# Patient Record
Sex: MALE | Race: WHITE | NOT HISPANIC OR LATINO | Employment: UNEMPLOYED | URBAN - METROPOLITAN AREA
[De-identification: names, ages, dates, MRNs, and addresses within clinical notes are randomized per-mention and may not be internally consistent; named-entity substitution may affect disease eponyms.]

---

## 2018-01-01 ENCOUNTER — HOSPITAL ENCOUNTER (INPATIENT)
Facility: HOSPITAL | Age: 83
LOS: 3 days | DRG: 308 | End: 2018-06-13
Attending: EMERGENCY MEDICINE | Admitting: INTERNAL MEDICINE
Payer: MEDICARE

## 2018-01-01 VITALS
HEART RATE: 59 BPM | OXYGEN SATURATION: 71 % | BODY MASS INDEX: 26.78 KG/M2 | SYSTOLIC BLOOD PRESSURE: 97 MMHG | WEIGHT: 197.75 LBS | HEIGHT: 72 IN | TEMPERATURE: 97 F | DIASTOLIC BLOOD PRESSURE: 55 MMHG

## 2018-01-01 DIAGNOSIS — Z46.59 ENCOUNTER FOR NASOGASTRIC (NG) TUBE PLACEMENT: ICD-10-CM

## 2018-01-01 DIAGNOSIS — J96.01 ACUTE HYPOXEMIC RESPIRATORY FAILURE: ICD-10-CM

## 2018-01-01 DIAGNOSIS — I49.01 CARDIAC ARREST WITH VENTRICULAR FIBRILLATION: ICD-10-CM

## 2018-01-01 DIAGNOSIS — Z01.818 ENCOUNTER FOR INTUBATION: ICD-10-CM

## 2018-01-01 DIAGNOSIS — I46.9 CARDIAC ARREST WITH VENTRICULAR FIBRILLATION: ICD-10-CM

## 2018-01-01 DIAGNOSIS — E87.5 HYPERKALEMIA: ICD-10-CM

## 2018-01-01 DIAGNOSIS — Z99.11 ON MECHANICALLY ASSISTED VENTILATION: ICD-10-CM

## 2018-01-01 DIAGNOSIS — I46.9 CARDIAC ARREST: ICD-10-CM

## 2018-01-01 LAB
ALBUMIN SERPL BCP-MCNC: 2.6 G/DL
ALBUMIN SERPL BCP-MCNC: 2.8 G/DL
ALBUMIN SERPL BCP-MCNC: 2.8 G/DL
ALBUMIN SERPL BCP-MCNC: 2.9 G/DL
ALBUMIN SERPL BCP-MCNC: 2.9 G/DL
ALBUMIN SERPL BCP-MCNC: 3.1 G/DL
ALLENS TEST: ABNORMAL
ALLENS TEST: NORMAL
ALP SERPL-CCNC: 104 U/L
ALP SERPL-CCNC: 134 U/L
ALP SERPL-CCNC: 157 U/L
ALP SERPL-CCNC: 158 U/L
ALP SERPL-CCNC: 91 U/L
ALP SERPL-CCNC: 96 U/L
ALT SERPL W/O P-5'-P-CCNC: 115 U/L
ALT SERPL W/O P-5'-P-CCNC: 172 U/L
ALT SERPL W/O P-5'-P-CCNC: 259 U/L
ALT SERPL W/O P-5'-P-CCNC: 390 U/L
ALT SERPL W/O P-5'-P-CCNC: 391 U/L
ALT SERPL W/O P-5'-P-CCNC: 92 U/L
AMORPH CRY UR QL COMP ASSIST: ABNORMAL
ANION GAP SERPL CALC-SCNC: 12 MMOL/L
ANION GAP SERPL CALC-SCNC: 13 MMOL/L
ANION GAP SERPL CALC-SCNC: 13 MMOL/L
ANION GAP SERPL CALC-SCNC: 14 MMOL/L
ANION GAP SERPL CALC-SCNC: 15 MMOL/L
ANION GAP SERPL CALC-SCNC: 16 MMOL/L
ANION GAP SERPL CALC-SCNC: 17 MMOL/L
ANION GAP SERPL CALC-SCNC: 17 MMOL/L
ANION GAP SERPL CALC-SCNC: 19 MMOL/L
ANION GAP SERPL CALC-SCNC: 19 MMOL/L
ANION GAP SERPL CALC-SCNC: 20 MMOL/L
ANION GAP SERPL CALC-SCNC: 20 MMOL/L
ANION GAP SERPL CALC-SCNC: 22 MMOL/L
ANION GAP SERPL CALC-SCNC: 22 MMOL/L
ANION GAP SERPL CALC-SCNC: 28 MMOL/L
ANISOCYTOSIS BLD QL SMEAR: SLIGHT
APTT BLDCRRT: 24.2 SEC
APTT BLDCRRT: 35.1 SEC
APTT BLDCRRT: 54.4 SEC
APTT BLDCRRT: <21 SEC
AST SERPL-CCNC: 130 U/L
AST SERPL-CCNC: 250 U/L
AST SERPL-CCNC: 358 U/L
AST SERPL-CCNC: 369 U/L
AST SERPL-CCNC: 73 U/L
AST SERPL-CCNC: 76 U/L
BACTERIA #/AREA URNS AUTO: ABNORMAL /HPF
BASOPHILS # BLD AUTO: 0.03 K/UL
BASOPHILS # BLD AUTO: 0.03 K/UL
BASOPHILS # BLD AUTO: 0.05 K/UL
BASOPHILS # BLD AUTO: 0.07 K/UL
BASOPHILS # BLD AUTO: ABNORMAL K/UL
BASOPHILS NFR BLD: 0 %
BASOPHILS NFR BLD: 0.1 %
BASOPHILS NFR BLD: 0.1 %
BASOPHILS NFR BLD: 0.2 %
BASOPHILS NFR BLD: 0.2 %
BILIRUB SERPL-MCNC: 0.3 MG/DL
BILIRUB SERPL-MCNC: 0.4 MG/DL
BILIRUB SERPL-MCNC: 0.5 MG/DL
BILIRUB UR QL STRIP: NEGATIVE
BUN SERPL-MCNC: 39 MG/DL
BUN SERPL-MCNC: 41 MG/DL
BUN SERPL-MCNC: 42 MG/DL
BUN SERPL-MCNC: 44 MG/DL (ref 6–30)
BUN SERPL-MCNC: 50 MG/DL
BUN SERPL-MCNC: 55 MG/DL
BUN SERPL-MCNC: 58 MG/DL
BUN SERPL-MCNC: 61 MG/DL
BUN SERPL-MCNC: 61 MG/DL
BUN SERPL-MCNC: 65 MG/DL
BUN SERPL-MCNC: 65 MG/DL
BUN SERPL-MCNC: 69 MG/DL
BUN SERPL-MCNC: 70 MG/DL
BUN SERPL-MCNC: 72 MG/DL
BUN SERPL-MCNC: 74 MG/DL
BUN SERPL-MCNC: 74 MG/DL
BUN SERPL-MCNC: 77 MG/DL
BUN SERPL-MCNC: 77 MG/DL
BUN SERPL-MCNC: 82 MG/DL
BUN SERPL-MCNC: 84 MG/DL
BUN SERPL-MCNC: 86 MG/DL
BUN SERPL-MCNC: 97 MG/DL
BUN SERPL-MCNC: 97 MG/DL
BURR CELLS BLD QL SMEAR: ABNORMAL
CA-I BLDV-SCNC: 1.2 MMOL/L
CALCIUM SERPL-MCNC: 10.1 MG/DL
CALCIUM SERPL-MCNC: 8.3 MG/DL
CALCIUM SERPL-MCNC: 8.5 MG/DL
CALCIUM SERPL-MCNC: 8.7 MG/DL
CALCIUM SERPL-MCNC: 8.7 MG/DL
CALCIUM SERPL-MCNC: 8.8 MG/DL
CALCIUM SERPL-MCNC: 8.8 MG/DL
CALCIUM SERPL-MCNC: 9 MG/DL
CALCIUM SERPL-MCNC: 9.1 MG/DL
CALCIUM SERPL-MCNC: 9.1 MG/DL
CALCIUM SERPL-MCNC: 9.2 MG/DL
CALCIUM SERPL-MCNC: 9.3 MG/DL
CALCIUM SERPL-MCNC: 9.3 MG/DL
CALCIUM SERPL-MCNC: 9.4 MG/DL
CHLORIDE SERPL-SCNC: 100 MMOL/L
CHLORIDE SERPL-SCNC: 101 MMOL/L
CHLORIDE SERPL-SCNC: 101 MMOL/L
CHLORIDE SERPL-SCNC: 103 MMOL/L
CHLORIDE SERPL-SCNC: 104 MMOL/L
CHLORIDE SERPL-SCNC: 105 MMOL/L
CHLORIDE SERPL-SCNC: 105 MMOL/L
CHLORIDE SERPL-SCNC: 106 MMOL/L
CHLORIDE SERPL-SCNC: 107 MMOL/L (ref 95–110)
CHLORIDE SERPL-SCNC: 98 MMOL/L
CHLORIDE SERPL-SCNC: 98 MMOL/L
CHLORIDE SERPL-SCNC: 99 MMOL/L
CHLORIDE SERPL-SCNC: 99 MMOL/L
CK SERPL-CCNC: 1134 U/L
CK SERPL-CCNC: 1625 U/L
CLARITY UR REFRACT.AUTO: ABNORMAL
CO2 SERPL-SCNC: 14 MMOL/L
CO2 SERPL-SCNC: 18 MMOL/L
CO2 SERPL-SCNC: 18 MMOL/L
CO2 SERPL-SCNC: 21 MMOL/L
CO2 SERPL-SCNC: 23 MMOL/L
CO2 SERPL-SCNC: 24 MMOL/L
CO2 SERPL-SCNC: 25 MMOL/L
CO2 SERPL-SCNC: 25 MMOL/L
CO2 SERPL-SCNC: 26 MMOL/L
CO2 SERPL-SCNC: 27 MMOL/L
COLOR UR AUTO: YELLOW
CREAT SERPL-MCNC: 2.9 MG/DL (ref 0.5–1.4)
CREAT SERPL-MCNC: 3.2 MG/DL
CREAT SERPL-MCNC: 3.4 MG/DL
CREAT SERPL-MCNC: 3.4 MG/DL
CREAT SERPL-MCNC: 3.8 MG/DL
CREAT SERPL-MCNC: 4.1 MG/DL
CREAT SERPL-MCNC: 4.4 MG/DL
CREAT SERPL-MCNC: 4.5 MG/DL
CREAT SERPL-MCNC: 4.5 MG/DL
CREAT SERPL-MCNC: 4.9 MG/DL
CREAT SERPL-MCNC: 5 MG/DL
CREAT SERPL-MCNC: 5.3 MG/DL
CREAT SERPL-MCNC: 5.6 MG/DL
CREAT SERPL-MCNC: 5.8 MG/DL
CREAT SERPL-MCNC: 6.3 MG/DL
CREAT SERPL-MCNC: 6.3 MG/DL
CREAT SERPL-MCNC: 6.5 MG/DL
CREAT SERPL-MCNC: 6.7 MG/DL
CREAT SERPL-MCNC: 7.1 MG/DL
CREAT SERPL-MCNC: 7.8 MG/DL
CREAT SERPL-MCNC: 7.8 MG/DL
DELSYS: ABNORMAL
DELSYS: NORMAL
DIFFERENTIAL METHOD: ABNORMAL
EOSINOPHIL # BLD AUTO: 0 K/UL
EOSINOPHIL # BLD AUTO: ABNORMAL K/UL
EOSINOPHIL NFR BLD: 0 %
EOSINOPHIL NFR BLD: 0.1 %
ERYTHROCYTE [DISTWIDTH] IN BLOOD BY AUTOMATED COUNT: 13.4 %
ERYTHROCYTE [DISTWIDTH] IN BLOOD BY AUTOMATED COUNT: 13.6 %
ERYTHROCYTE [DISTWIDTH] IN BLOOD BY AUTOMATED COUNT: 13.6 %
ERYTHROCYTE [DISTWIDTH] IN BLOOD BY AUTOMATED COUNT: 14 %
ERYTHROCYTE [DISTWIDTH] IN BLOOD BY AUTOMATED COUNT: 14.3 %
ERYTHROCYTE [SEDIMENTATION RATE] IN BLOOD BY WESTERGREN METHOD: 16 MM/H
ERYTHROCYTE [SEDIMENTATION RATE] IN BLOOD BY WESTERGREN METHOD: 16 MM/H
ERYTHROCYTE [SEDIMENTATION RATE] IN BLOOD BY WESTERGREN METHOD: 20 MM/H
ERYTHROCYTE [SEDIMENTATION RATE] IN BLOOD BY WESTERGREN METHOD: 20 MM/H
EST. GFR  (AFRICAN AMERICAN): 10 ML/MIN/1.73 M^2
EST. GFR  (AFRICAN AMERICAN): 10.7 ML/MIN/1.73 M^2
EST. GFR  (AFRICAN AMERICAN): 11.4 ML/MIN/1.73 M^2
EST. GFR  (AFRICAN AMERICAN): 11.7 ML/MIN/1.73 M^2
EST. GFR  (AFRICAN AMERICAN): 13 ML/MIN/1.73 M^2
EST. GFR  (AFRICAN AMERICAN): 13 ML/MIN/1.73 M^2
EST. GFR  (AFRICAN AMERICAN): 13.4 ML/MIN/1.73 M^2
EST. GFR  (AFRICAN AMERICAN): 14.5 ML/MIN/1.73 M^2
EST. GFR  (AFRICAN AMERICAN): 15.9 ML/MIN/1.73 M^2
EST. GFR  (AFRICAN AMERICAN): 18.2 ML/MIN/1.73 M^2
EST. GFR  (AFRICAN AMERICAN): 18.2 ML/MIN/1.73 M^2
EST. GFR  (AFRICAN AMERICAN): 19.6 ML/MIN/1.73 M^2
EST. GFR  (AFRICAN AMERICAN): 6.7 ML/MIN/1.73 M^2
EST. GFR  (AFRICAN AMERICAN): 6.7 ML/MIN/1.73 M^2
EST. GFR  (AFRICAN AMERICAN): 7.5 ML/MIN/1.73 M^2
EST. GFR  (AFRICAN AMERICAN): 8 ML/MIN/1.73 M^2
EST. GFR  (AFRICAN AMERICAN): 8.3 ML/MIN/1.73 M^2
EST. GFR  (AFRICAN AMERICAN): 8.7 ML/MIN/1.73 M^2
EST. GFR  (AFRICAN AMERICAN): 8.7 ML/MIN/1.73 M^2
EST. GFR  (AFRICAN AMERICAN): 9.6 ML/MIN/1.73 M^2
EST. GFR  (NON AFRICAN AMERICAN): 10.1 ML/MIN/1.73 M^2
EST. GFR  (NON AFRICAN AMERICAN): 11.2 ML/MIN/1.73 M^2
EST. GFR  (NON AFRICAN AMERICAN): 11.2 ML/MIN/1.73 M^2
EST. GFR  (NON AFRICAN AMERICAN): 11.6 ML/MIN/1.73 M^2
EST. GFR  (NON AFRICAN AMERICAN): 12.6 ML/MIN/1.73 M^2
EST. GFR  (NON AFRICAN AMERICAN): 13.8 ML/MIN/1.73 M^2
EST. GFR  (NON AFRICAN AMERICAN): 15.8 ML/MIN/1.73 M^2
EST. GFR  (NON AFRICAN AMERICAN): 15.8 ML/MIN/1.73 M^2
EST. GFR  (NON AFRICAN AMERICAN): 17 ML/MIN/1.73 M^2
EST. GFR  (NON AFRICAN AMERICAN): 5.8 ML/MIN/1.73 M^2
EST. GFR  (NON AFRICAN AMERICAN): 5.8 ML/MIN/1.73 M^2
EST. GFR  (NON AFRICAN AMERICAN): 6.5 ML/MIN/1.73 M^2
EST. GFR  (NON AFRICAN AMERICAN): 6.9 ML/MIN/1.73 M^2
EST. GFR  (NON AFRICAN AMERICAN): 7.2 ML/MIN/1.73 M^2
EST. GFR  (NON AFRICAN AMERICAN): 7.5 ML/MIN/1.73 M^2
EST. GFR  (NON AFRICAN AMERICAN): 7.5 ML/MIN/1.73 M^2
EST. GFR  (NON AFRICAN AMERICAN): 8.3 ML/MIN/1.73 M^2
EST. GFR  (NON AFRICAN AMERICAN): 8.6 ML/MIN/1.73 M^2
EST. GFR  (NON AFRICAN AMERICAN): 9.2 ML/MIN/1.73 M^2
EST. GFR  (NON AFRICAN AMERICAN): 9.9 ML/MIN/1.73 M^2
ESTIMATED AVG GLUCOSE: 146 MG/DL
FACT X PPP CHRO-ACNC: 0.28 IU/ML
FACT X PPP CHRO-ACNC: 0.44 IU/ML
FACT X PPP CHRO-ACNC: 0.5 IU/ML
FACT X PPP CHRO-ACNC: 0.6 IU/ML
FACT X PPP CHRO-ACNC: 0.84 IU/ML
FACT X PPP CHRO-ACNC: 0.84 IU/ML
FIO2: 100
FIO2: 60
GLUCOSE SERPL-MCNC: 105 MG/DL
GLUCOSE SERPL-MCNC: 105 MG/DL
GLUCOSE SERPL-MCNC: 107 MG/DL
GLUCOSE SERPL-MCNC: 107 MG/DL
GLUCOSE SERPL-MCNC: 111 MG/DL
GLUCOSE SERPL-MCNC: 111 MG/DL
GLUCOSE SERPL-MCNC: 116 MG/DL
GLUCOSE SERPL-MCNC: 125 MG/DL
GLUCOSE SERPL-MCNC: 125 MG/DL
GLUCOSE SERPL-MCNC: 142 MG/DL
GLUCOSE SERPL-MCNC: 142 MG/DL
GLUCOSE SERPL-MCNC: 163 MG/DL
GLUCOSE SERPL-MCNC: 209 MG/DL
GLUCOSE SERPL-MCNC: 209 MG/DL
GLUCOSE SERPL-MCNC: 235 MG/DL (ref 70–110)
GLUCOSE SERPL-MCNC: 244 MG/DL
GLUCOSE SERPL-MCNC: 299 MG/DL
GLUCOSE SERPL-MCNC: 299 MG/DL
GLUCOSE SERPL-MCNC: 305 MG/DL
GLUCOSE SERPL-MCNC: 315 MG/DL
GLUCOSE SERPL-MCNC: 353 MG/DL
GLUCOSE SERPL-MCNC: 353 MG/DL
GLUCOSE SERPL-MCNC: 375 MG/DL
GLUCOSE SERPL-MCNC: 375 MG/DL
GLUCOSE SERPL-MCNC: 86 MG/DL
GLUCOSE SERPL-MCNC: 88 MG/DL
GLUCOSE SERPL-MCNC: 88 MG/DL
GLUCOSE SERPL-MCNC: 89 MG/DL
GLUCOSE SERPL-MCNC: 89 MG/DL
GLUCOSE SERPL-MCNC: 93 MG/DL
GLUCOSE SERPL-MCNC: 93 MG/DL
GLUCOSE SERPL-MCNC: 95 MG/DL
GLUCOSE SERPL-MCNC: 95 MG/DL
GLUCOSE UR QL STRIP: ABNORMAL
HBA1C MFR BLD HPLC: 6.7 %
HCO3 UR-SCNC: 21.3 MMOL/L (ref 24–28)
HCO3 UR-SCNC: 23.9 MMOL/L (ref 24–28)
HCO3 UR-SCNC: 26.6 MMOL/L (ref 24–28)
HCO3 UR-SCNC: 28.4 MMOL/L (ref 24–28)
HCT VFR BLD AUTO: 27.1 %
HCT VFR BLD AUTO: 27.3 %
HCT VFR BLD AUTO: 27.4 %
HCT VFR BLD AUTO: 32.6 %
HCT VFR BLD AUTO: 38.2 %
HCT VFR BLD CALC: 33 %PCV (ref 36–54)
HGB BLD-MCNC: 10.4 G/DL
HGB BLD-MCNC: 11.2 G/DL
HGB BLD-MCNC: 8.6 G/DL
HGB BLD-MCNC: 8.7 G/DL
HGB BLD-MCNC: 8.8 G/DL
HGB UR QL STRIP: ABNORMAL
HYALINE CASTS UR QL AUTO: 0 /LPF
HYPOCHROMIA BLD QL SMEAR: ABNORMAL
IMM GRANULOCYTES # BLD AUTO: 0.14 K/UL
IMM GRANULOCYTES # BLD AUTO: 0.26 K/UL
IMM GRANULOCYTES # BLD AUTO: 0.36 K/UL
IMM GRANULOCYTES # BLD AUTO: 0.5 K/UL
IMM GRANULOCYTES # BLD AUTO: ABNORMAL K/UL
IMM GRANULOCYTES NFR BLD AUTO: 0.6 %
IMM GRANULOCYTES NFR BLD AUTO: 1 %
IMM GRANULOCYTES NFR BLD AUTO: 1.6 %
IMM GRANULOCYTES NFR BLD AUTO: 1.7 %
IMM GRANULOCYTES NFR BLD AUTO: ABNORMAL %
INR PPP: 1
INR PPP: 1
INR PPP: 1.1
IP: 25
IT: 0.9
KETONES UR QL STRIP: NEGATIVE
LACTATE SERPL-SCNC: 0.7 MMOL/L
LACTATE SERPL-SCNC: 0.7 MMOL/L
LACTATE SERPL-SCNC: 0.8 MMOL/L
LACTATE SERPL-SCNC: 0.8 MMOL/L
LACTATE SERPL-SCNC: 0.9 MMOL/L
LACTATE SERPL-SCNC: 0.9 MMOL/L
LACTATE SERPL-SCNC: 1.2 MMOL/L
LACTATE SERPL-SCNC: 1.3 MMOL/L
LACTATE SERPL-SCNC: 1.3 MMOL/L
LACTATE SERPL-SCNC: 1.4 MMOL/L
LACTATE SERPL-SCNC: 1.9 MMOL/L
LACTATE SERPL-SCNC: 2.1 MMOL/L
LACTATE SERPL-SCNC: 2.3 MMOL/L
LACTATE SERPL-SCNC: 2.5 MMOL/L
LACTATE SERPL-SCNC: 2.6 MMOL/L
LACTATE SERPL-SCNC: >12 MMOL/L
LDH SERPL L TO P-CCNC: 10.93 MMOL/L (ref 0.36–1.25)
LEUKOCYTE ESTERASE UR QL STRIP: NEGATIVE
LYMPHOCYTES # BLD AUTO: 1 K/UL
LYMPHOCYTES # BLD AUTO: 1.1 K/UL
LYMPHOCYTES # BLD AUTO: 1.4 K/UL
LYMPHOCYTES # BLD AUTO: 2 K/UL
LYMPHOCYTES # BLD AUTO: ABNORMAL K/UL
LYMPHOCYTES NFR BLD: 3.2 %
LYMPHOCYTES NFR BLD: 5.1 %
LYMPHOCYTES NFR BLD: 5.2 %
LYMPHOCYTES NFR BLD: 54 %
LYMPHOCYTES NFR BLD: 9.1 %
MAGNESIUM SERPL-MCNC: 1.7 MG/DL
MAGNESIUM SERPL-MCNC: 2 MG/DL
MAGNESIUM SERPL-MCNC: 2.1 MG/DL
MAGNESIUM SERPL-MCNC: 2.1 MG/DL
MAGNESIUM SERPL-MCNC: 2.2 MG/DL
MAGNESIUM SERPL-MCNC: 2.2 MG/DL
MAGNESIUM SERPL-MCNC: 2.3 MG/DL
MAP: 16
MCH RBC QN AUTO: 30.4 PG
MCH RBC QN AUTO: 30.8 PG
MCH RBC QN AUTO: 31.2 PG
MCH RBC QN AUTO: 31.3 PG
MCH RBC QN AUTO: 31.3 PG
MCHC RBC AUTO-ENTMCNC: 29.3 G/DL
MCHC RBC AUTO-ENTMCNC: 31.7 G/DL
MCHC RBC AUTO-ENTMCNC: 31.8 G/DL
MCHC RBC AUTO-ENTMCNC: 31.9 G/DL
MCHC RBC AUTO-ENTMCNC: 32.2 G/DL
MCV RBC AUTO: 105 FL
MCV RBC AUTO: 95 FL
MCV RBC AUTO: 98 FL
MCV RBC AUTO: 99 FL
MCV RBC AUTO: 99 FL
METAMYELOCYTES NFR BLD MANUAL: 1 %
MICROSCOPIC COMMENT: ABNORMAL
MITRAL VALVE MOBILITY: NORMAL
MITRAL VALVE REGURGITATION: ABNORMAL
MODE: ABNORMAL
MODE: NORMAL
MONOCYTES # BLD AUTO: 1.3 K/UL
MONOCYTES # BLD AUTO: 1.4 K/UL
MONOCYTES # BLD AUTO: 2.1 K/UL
MONOCYTES # BLD AUTO: 2.5 K/UL
MONOCYTES # BLD AUTO: ABNORMAL K/UL
MONOCYTES NFR BLD: 4.4 %
MONOCYTES NFR BLD: 5 %
MONOCYTES NFR BLD: 6.1 %
MONOCYTES NFR BLD: 9.2 %
MONOCYTES NFR BLD: 9.5 %
NEUTROPHILS # BLD AUTO: 17.4 K/UL
NEUTROPHILS # BLD AUTO: 19.4 K/UL
NEUTROPHILS # BLD AUTO: 22.9 K/UL
NEUTROPHILS # BLD AUTO: 26.8 K/UL
NEUTROPHILS NFR BLD: 39 %
NEUTROPHILS NFR BLD: 80.7 %
NEUTROPHILS NFR BLD: 84.4 %
NEUTROPHILS NFR BLD: 87.1 %
NEUTROPHILS NFR BLD: 90.4 %
NEUTS BAND NFR BLD MANUAL: 1 %
NITRITE UR QL STRIP: NEGATIVE
NRBC BLD-RTO: 0 /100 WBC
NSE SERPL-MCNC: 29 NG/ML
NSE SERPL-MCNC: NORMAL UG/L
PCO2 BLDA: 30.7 MMHG (ref 35–45)
PCO2 BLDA: 47.3 MMHG (ref 35–45)
PCO2 BLDA: 54.5 MMHG (ref 35–45)
PCO2 BLDA: 81.1 MMHG (ref 35–45)
PEEP: 5
PEEP: 8
PH SMN: 7.03 [PH] (ref 7.35–7.45)
PH SMN: 7.25 [PH] (ref 7.35–7.45)
PH SMN: 7.39 [PH] (ref 7.35–7.45)
PH SMN: 7.55 [PH] (ref 7.35–7.45)
PH UR STRIP: 6 [PH] (ref 5–8)
PHOSPHATE SERPL-MCNC: 2.8 MG/DL
PHOSPHATE SERPL-MCNC: 3.2 MG/DL
PHOSPHATE SERPL-MCNC: 3.7 MG/DL
PHOSPHATE SERPL-MCNC: 4.2 MG/DL
PHOSPHATE SERPL-MCNC: 4.4 MG/DL
PHOSPHATE SERPL-MCNC: 4.5 MG/DL
PHOSPHATE SERPL-MCNC: 5 MG/DL
PHOSPHATE SERPL-MCNC: 5.1 MG/DL
PHOSPHATE SERPL-MCNC: 5.5 MG/DL
PHOSPHATE SERPL-MCNC: 5.6 MG/DL
PHOSPHATE SERPL-MCNC: 6 MG/DL
PHOSPHATE SERPL-MCNC: 6.2 MG/DL
PHOSPHATE SERPL-MCNC: 6.5 MG/DL
PHOSPHATE SERPL-MCNC: 6.7 MG/DL
PHOSPHATE SERPL-MCNC: 6.9 MG/DL
PHOSPHATE SERPL-MCNC: 7.7 MG/DL
PHOSPHATE SERPL-MCNC: 8.2 MG/DL
PHOSPHATE SERPL-MCNC: 8.9 MG/DL
PIP: 33
PLATELET # BLD AUTO: 162 K/UL
PLATELET # BLD AUTO: 165 K/UL
PLATELET # BLD AUTO: 168 K/UL
PLATELET # BLD AUTO: 178 K/UL
PLATELET # BLD AUTO: 207 K/UL
PLATELET BLD QL SMEAR: ABNORMAL
PMV BLD AUTO: 10 FL
PMV BLD AUTO: 10.3 FL
PMV BLD AUTO: 10.7 FL
PMV BLD AUTO: 11.2 FL
PMV BLD AUTO: 11.4 FL
PO2 BLDA: 150 MMHG (ref 80–100)
PO2 BLDA: 327 MMHG (ref 80–100)
PO2 BLDA: 39 MMHG (ref 40–60)
PO2 BLDA: 76 MMHG (ref 80–100)
POC BE: -10 MMOL/L
POC BE: -3 MMOL/L
POC BE: 3 MMOL/L
POC BE: 4 MMOL/L
POC IONIZED CALCIUM: 1.21 MMOL/L (ref 1.06–1.42)
POC SATURATED O2: 100 % (ref 95–100)
POC SATURATED O2: 100 % (ref 95–100)
POC SATURATED O2: 48 % (ref 95–100)
POC SATURATED O2: 95 % (ref 95–100)
POC TCO2 (MEASURED): 21 MMOL/L (ref 23–29)
POC TCO2: 24 MMOL/L (ref 24–29)
POC TCO2: 25 MMOL/L (ref 23–27)
POC TCO2: 28 MMOL/L (ref 23–27)
POC TCO2: 30 MMOL/L (ref 23–27)
POCT GLUCOSE: 119 MG/DL (ref 70–110)
POCT GLUCOSE: 143 MG/DL (ref 70–110)
POCT GLUCOSE: 143 MG/DL (ref 70–110)
POCT GLUCOSE: 165 MG/DL (ref 70–110)
POCT GLUCOSE: 270 MG/DL (ref 70–110)
POCT GLUCOSE: 335 MG/DL (ref 70–110)
POCT GLUCOSE: 420 MG/DL (ref 70–110)
POCT GLUCOSE: 75 MG/DL (ref 70–110)
POCT GLUCOSE: 78 MG/DL (ref 70–110)
POCT GLUCOSE: 91 MG/DL (ref 70–110)
POCT GLUCOSE: 94 MG/DL (ref 70–110)
POIKILOCYTOSIS BLD QL SMEAR: SLIGHT
POLYCHROMASIA BLD QL SMEAR: ABNORMAL
POTASSIUM BLD-SCNC: 3.9 MMOL/L (ref 3.5–5.1)
POTASSIUM BLD-SCNC: 4.5 MMOL/L (ref 3.5–5.1)
POTASSIUM SERPL-SCNC: 4 MMOL/L
POTASSIUM SERPL-SCNC: 4.1 MMOL/L
POTASSIUM SERPL-SCNC: 4.2 MMOL/L
POTASSIUM SERPL-SCNC: 4.4 MMOL/L
POTASSIUM SERPL-SCNC: 4.5 MMOL/L
POTASSIUM SERPL-SCNC: 4.5 MMOL/L
POTASSIUM SERPL-SCNC: 4.6 MMOL/L
POTASSIUM SERPL-SCNC: 4.8 MMOL/L
POTASSIUM SERPL-SCNC: 5 MMOL/L
POTASSIUM SERPL-SCNC: 5.2 MMOL/L
POTASSIUM SERPL-SCNC: 5.5 MMOL/L
POTASSIUM SERPL-SCNC: 5.8 MMOL/L
PROT SERPL-MCNC: 6.1 G/DL
PROT SERPL-MCNC: 6.4 G/DL
PROT SERPL-MCNC: 6.5 G/DL
PROT SERPL-MCNC: 7.1 G/DL
PROT UR QL STRIP: ABNORMAL
PROTHROMBIN TIME: 10.3 SEC
PROTHROMBIN TIME: 10.4 SEC
PROTHROMBIN TIME: 11 SEC
PROTHROMBIN TIME: 11.3 SEC
PROTHROMBIN TIME: 11.4 SEC
RBC # BLD AUTO: 2.75 M/UL
RBC # BLD AUTO: 2.78 M/UL
RBC # BLD AUTO: 2.89 M/UL
RBC # BLD AUTO: 3.33 M/UL
RBC # BLD AUTO: 3.64 M/UL
RBC #/AREA URNS AUTO: 7 /HPF (ref 0–4)
RETIRED EF AND QEF - SEE NOTES: 35 (ref 55–65)
SAMPLE: ABNORMAL
SAMPLE: NORMAL
SITE: ABNORMAL
SITE: NORMAL
SODIUM BLD-SCNC: 146 MMOL/L (ref 136–145)
SODIUM SERPL-SCNC: 138 MMOL/L
SODIUM SERPL-SCNC: 138 MMOL/L
SODIUM SERPL-SCNC: 140 MMOL/L
SODIUM SERPL-SCNC: 141 MMOL/L
SODIUM SERPL-SCNC: 142 MMOL/L
SODIUM SERPL-SCNC: 142 MMOL/L
SODIUM SERPL-SCNC: 143 MMOL/L
SODIUM SERPL-SCNC: 144 MMOL/L
SODIUM SERPL-SCNC: 148 MMOL/L
SP GR UR STRIP: 1.01 (ref 1–1.03)
SP02: 100
SQUAMOUS #/AREA URNS AUTO: 2 /HPF
TROPONIN I SERPL DL<=0.01 NG/ML-MCNC: 0.07 NG/ML
TROPONIN I SERPL DL<=0.01 NG/ML-MCNC: 33.3 NG/ML
TROPONIN I SERPL DL<=0.01 NG/ML-MCNC: >50 NG/ML
TROPONIN I SERPL DL<=0.01 NG/ML-MCNC: >50 NG/ML
URN SPEC COLLECT METH UR: ABNORMAL
UROBILINOGEN UR STRIP-ACNC: NEGATIVE EU/DL
VT: 450
VT: 500
VT: 500
VT: 635
WBC # BLD AUTO: 20.86 K/UL
WBC # BLD AUTO: 21.61 K/UL
WBC # BLD AUTO: 22.29 K/UL
WBC # BLD AUTO: 27.09 K/UL
WBC # BLD AUTO: 29.64 K/UL
WBC #/AREA URNS AUTO: 6 /HPF (ref 0–5)

## 2018-01-01 PROCEDURE — 99291 CRITICAL CARE FIRST HOUR: CPT | Mod: GC,,, | Performed by: INTERNAL MEDICINE

## 2018-01-01 PROCEDURE — 99900026 HC AIRWAY MAINTENANCE (STAT)

## 2018-01-01 PROCEDURE — 83520 IMMUNOASSAY QUANT NOS NONAB: CPT

## 2018-01-01 PROCEDURE — 94761 N-INVAS EAR/PLS OXIMETRY MLT: CPT

## 2018-01-01 PROCEDURE — 83735 ASSAY OF MAGNESIUM: CPT

## 2018-01-01 PROCEDURE — 83036 HEMOGLOBIN GLYCOSYLATED A1C: CPT

## 2018-01-01 PROCEDURE — 85730 THROMBOPLASTIN TIME PARTIAL: CPT

## 2018-01-01 PROCEDURE — 25000003 PHARM REV CODE 250: Performed by: STUDENT IN AN ORGANIZED HEALTH CARE EDUCATION/TRAINING PROGRAM

## 2018-01-01 PROCEDURE — 96366 THER/PROPH/DIAG IV INF ADDON: CPT

## 2018-01-01 PROCEDURE — 63600175 PHARM REV CODE 636 W HCPCS: Performed by: INTERNAL MEDICINE

## 2018-01-01 PROCEDURE — 31500 INSERT EMERGENCY AIRWAY: CPT | Mod: ,,, | Performed by: EMERGENCY MEDICINE

## 2018-01-01 PROCEDURE — 80053 COMPREHEN METABOLIC PANEL: CPT

## 2018-01-01 PROCEDURE — 83735 ASSAY OF MAGNESIUM: CPT | Mod: 91

## 2018-01-01 PROCEDURE — 81001 URINALYSIS AUTO W/SCOPE: CPT

## 2018-01-01 PROCEDURE — 63600175 PHARM REV CODE 636 W HCPCS: Performed by: HOSPITALIST

## 2018-01-01 PROCEDURE — 63600175 PHARM REV CODE 636 W HCPCS

## 2018-01-01 PROCEDURE — 84484 ASSAY OF TROPONIN QUANT: CPT | Mod: 91

## 2018-01-01 PROCEDURE — 94003 VENT MGMT INPAT SUBQ DAY: CPT

## 2018-01-01 PROCEDURE — 27000221 HC OXYGEN, UP TO 24 HOURS

## 2018-01-01 PROCEDURE — 85025 COMPLETE CBC W/AUTO DIFF WBC: CPT

## 2018-01-01 PROCEDURE — 06HM33Z INSERTION OF INFUSION DEVICE INTO RIGHT FEMORAL VEIN, PERCUTANEOUS APPROACH: ICD-10-PCS | Performed by: INTERNAL MEDICINE

## 2018-01-01 PROCEDURE — 80048 BASIC METABOLIC PNL TOTAL CA: CPT

## 2018-01-01 PROCEDURE — 63600175 PHARM REV CODE 636 W HCPCS: Performed by: STUDENT IN AN ORGANIZED HEALTH CARE EDUCATION/TRAINING PROGRAM

## 2018-01-01 PROCEDURE — 85610 PROTHROMBIN TIME: CPT

## 2018-01-01 PROCEDURE — 63600175 PHARM REV CODE 636 W HCPCS: Performed by: EMERGENCY MEDICINE

## 2018-01-01 PROCEDURE — S0028 INJECTION, FAMOTIDINE, 20 MG: HCPCS | Performed by: STUDENT IN AN ORGANIZED HEALTH CARE EDUCATION/TRAINING PROGRAM

## 2018-01-01 PROCEDURE — 36556 INSERT NON-TUNNEL CV CATH: CPT

## 2018-01-01 PROCEDURE — 85520 HEPARIN ASSAY: CPT

## 2018-01-01 PROCEDURE — 31500 INSERT EMERGENCY AIRWAY: CPT

## 2018-01-01 PROCEDURE — 83605 ASSAY OF LACTIC ACID: CPT | Mod: 91

## 2018-01-01 PROCEDURE — 93306 TTE W/DOPPLER COMPLETE: CPT

## 2018-01-01 PROCEDURE — 82550 ASSAY OF CK (CPK): CPT | Mod: 91

## 2018-01-01 PROCEDURE — 25000003 PHARM REV CODE 250: Performed by: EMERGENCY MEDICINE

## 2018-01-01 PROCEDURE — 93010 ELECTROCARDIOGRAM REPORT: CPT | Mod: 76,,, | Performed by: INTERNAL MEDICINE

## 2018-01-01 PROCEDURE — 0BH17EZ INSERTION OF ENDOTRACHEAL AIRWAY INTO TRACHEA, VIA NATURAL OR ARTIFICIAL OPENING: ICD-10-PCS | Performed by: EMERGENCY MEDICINE

## 2018-01-01 PROCEDURE — 20000000 HC ICU ROOM

## 2018-01-01 PROCEDURE — 84132 ASSAY OF SERUM POTASSIUM: CPT

## 2018-01-01 PROCEDURE — 96368 THER/DIAG CONCURRENT INF: CPT

## 2018-01-01 PROCEDURE — 27100171 HC OXYGEN HIGH FLOW UP TO 24 HOURS

## 2018-01-01 PROCEDURE — 84100 ASSAY OF PHOSPHORUS: CPT

## 2018-01-01 PROCEDURE — 80048 BASIC METABOLIC PNL TOTAL CA: CPT | Mod: 91

## 2018-01-01 PROCEDURE — 99291 CRITICAL CARE FIRST HOUR: CPT | Mod: 25

## 2018-01-01 PROCEDURE — 82803 BLOOD GASES ANY COMBINATION: CPT

## 2018-01-01 PROCEDURE — 43752 NASAL/OROGASTRIC W/TUBE PLMT: CPT

## 2018-01-01 PROCEDURE — 83605 ASSAY OF LACTIC ACID: CPT

## 2018-01-01 PROCEDURE — 99900035 HC TECH TIME PER 15 MIN (STAT)

## 2018-01-01 PROCEDURE — 99285 EMERGENCY DEPT VISIT HI MDM: CPT | Mod: 25,,, | Performed by: EMERGENCY MEDICINE

## 2018-01-01 PROCEDURE — 99292 CRITICAL CARE ADDL 30 MIN: CPT | Mod: ,,, | Performed by: INTERNAL MEDICINE

## 2018-01-01 PROCEDURE — 99291 CRITICAL CARE FIRST HOUR: CPT | Mod: ,,, | Performed by: INTERNAL MEDICINE

## 2018-01-01 PROCEDURE — 85610 PROTHROMBIN TIME: CPT | Mod: 91

## 2018-01-01 PROCEDURE — 80053 COMPREHEN METABOLIC PANEL: CPT | Mod: 91

## 2018-01-01 PROCEDURE — 84100 ASSAY OF PHOSPHORUS: CPT | Mod: 91

## 2018-01-01 PROCEDURE — 25000003 PHARM REV CODE 250: Performed by: INTERNAL MEDICINE

## 2018-01-01 PROCEDURE — 93306 TTE W/DOPPLER COMPLETE: CPT | Mod: 26,,, | Performed by: INTERNAL MEDICINE

## 2018-01-01 PROCEDURE — 27200966 HC CLOSED SUCTION SYSTEM

## 2018-01-01 PROCEDURE — 95812 EEG 41-60 MINUTES: CPT

## 2018-01-01 PROCEDURE — 96375 TX/PRO/DX INJ NEW DRUG ADDON: CPT

## 2018-01-01 PROCEDURE — 95813 EEG EXTND MNTR 61-119 MIN: CPT | Mod: 26,,, | Performed by: PSYCHIATRY & NEUROLOGY

## 2018-01-01 PROCEDURE — 93005 ELECTROCARDIOGRAM TRACING: CPT

## 2018-01-01 PROCEDURE — 85520 HEPARIN ASSAY: CPT | Mod: 91

## 2018-01-01 PROCEDURE — 5A12012 PERFORMANCE OF CARDIAC OUTPUT, SINGLE, MANUAL: ICD-10-PCS | Performed by: EMERGENCY MEDICINE

## 2018-01-01 PROCEDURE — 97802 MEDICAL NUTRITION INDIV IN: CPT

## 2018-01-01 PROCEDURE — 27100092 HC HIGH FLOW DELIVERY CANNULA

## 2018-01-01 PROCEDURE — 85027 COMPLETE CBC AUTOMATED: CPT

## 2018-01-01 PROCEDURE — 36600 WITHDRAWAL OF ARTERIAL BLOOD: CPT

## 2018-01-01 PROCEDURE — 82330 ASSAY OF CALCIUM: CPT

## 2018-01-01 PROCEDURE — 96365 THER/PROPH/DIAG IV INF INIT: CPT | Mod: XS

## 2018-01-01 PROCEDURE — 6A4Z1ZZ HYPOTHERMIA, MULTIPLE: ICD-10-PCS | Performed by: INTERNAL MEDICINE

## 2018-01-01 PROCEDURE — 94002 VENT MGMT INPAT INIT DAY: CPT

## 2018-01-01 PROCEDURE — 93010 ELECTROCARDIOGRAM REPORT: CPT | Mod: ,,, | Performed by: INTERNAL MEDICINE

## 2018-01-01 PROCEDURE — 87040 BLOOD CULTURE FOR BACTERIA: CPT

## 2018-01-01 PROCEDURE — 27201463 HC QUATTRO CATH KIT

## 2018-01-01 PROCEDURE — 5A1945Z RESPIRATORY VENTILATION, 24-96 CONSECUTIVE HOURS: ICD-10-PCS | Performed by: INTERNAL MEDICINE

## 2018-01-01 PROCEDURE — 85007 BL SMEAR W/DIFF WBC COUNT: CPT

## 2018-01-01 PROCEDURE — 5A2204Z RESTORATION OF CARDIAC RHYTHM, SINGLE: ICD-10-PCS | Performed by: EMERGENCY MEDICINE

## 2018-01-01 PROCEDURE — 25000003 PHARM REV CODE 250

## 2018-01-01 PROCEDURE — 84484 ASSAY OF TROPONIN QUANT: CPT

## 2018-01-01 PROCEDURE — 99199 UNLISTED SPECIAL SVC PX/RPRT: CPT

## 2018-01-01 PROCEDURE — 36415 COLL VENOUS BLD VENIPUNCTURE: CPT

## 2018-01-01 RX ORDER — MAGNESIUM SULFATE HEPTAHYDRATE 40 MG/ML
2 INJECTION, SOLUTION INTRAVENOUS
Status: CANCELLED | OUTPATIENT
Start: 2018-01-01

## 2018-01-01 RX ORDER — ATORVASTATIN CALCIUM 20 MG/1
40 TABLET, FILM COATED ORAL DAILY
Status: DISCONTINUED | OUTPATIENT
Start: 2018-01-01 | End: 2018-01-01

## 2018-01-01 RX ORDER — ROCURONIUM BROMIDE 10 MG/ML
100 INJECTION, SOLUTION INTRAVENOUS
Status: COMPLETED | OUTPATIENT
Start: 2018-01-01 | End: 2018-01-01

## 2018-01-01 RX ORDER — LORAZEPAM 2 MG/ML
2 INJECTION INTRAMUSCULAR EVERY 10 MIN PRN
Status: DISCONTINUED | OUTPATIENT
Start: 2018-01-01 | End: 2018-01-01 | Stop reason: HOSPADM

## 2018-01-01 RX ORDER — HEPARIN SODIUM 5000 [USP'U]/ML
5000 INJECTION, SOLUTION INTRAVENOUS; SUBCUTANEOUS EVERY 8 HOURS
Status: DISCONTINUED | OUTPATIENT
Start: 2018-01-01 | End: 2018-01-01

## 2018-01-01 RX ORDER — CALCIUM CHLORIDE INJECTION 100 MG/ML
INJECTION, SOLUTION INTRAVENOUS CODE/TRAUMA/SEDATION MEDICATION
Status: DISCONTINUED | OUTPATIENT
Start: 2018-01-01 | End: 2018-01-01

## 2018-01-01 RX ORDER — ATROPINE SULFATE 0.1 MG/ML
INJECTION INTRAVENOUS CODE/TRAUMA/SEDATION MEDICATION
Status: DISCONTINUED | OUTPATIENT
Start: 2018-01-01 | End: 2018-01-01

## 2018-01-01 RX ORDER — LIDOCAINE HYDROCHLORIDE AND EPINEPHRINE 10; 10 MG/ML; UG/ML
1 INJECTION, SOLUTION INFILTRATION; PERINEURAL ONCE
Status: DISCONTINUED | OUTPATIENT
Start: 2018-01-01 | End: 2018-01-01

## 2018-01-01 RX ORDER — NAPROXEN SODIUM 220 MG/1
81 TABLET, FILM COATED ORAL DAILY
Status: DISCONTINUED | OUTPATIENT
Start: 2018-01-01 | End: 2018-01-01

## 2018-01-01 RX ORDER — MAGNESIUM SULFATE HEPTAHYDRATE 40 MG/ML
2 INJECTION, SOLUTION INTRAVENOUS
Status: COMPLETED | OUTPATIENT
Start: 2018-01-01 | End: 2018-01-01

## 2018-01-01 RX ORDER — SODIUM BICARBONATE 1 MEQ/ML
SYRINGE (ML) INTRAVENOUS CODE/TRAUMA/SEDATION MEDICATION
Status: DISCONTINUED | OUTPATIENT
Start: 2018-01-01 | End: 2018-01-01

## 2018-01-01 RX ORDER — ACETAMINOPHEN 650 MG/20.3ML
650 LIQUID ORAL EVERY 6 HOURS
Status: DISCONTINUED | OUTPATIENT
Start: 2018-01-01 | End: 2018-01-01

## 2018-01-01 RX ORDER — EPINEPHRINE 0.1 MG/ML
INJECTION INTRAVENOUS CODE/TRAUMA/SEDATION MEDICATION
Status: DISCONTINUED | OUTPATIENT
Start: 2018-01-01 | End: 2018-01-01

## 2018-01-01 RX ORDER — BUSPIRONE HYDROCHLORIDE 10 MG/1
30 TABLET ORAL ONCE
Status: COMPLETED | OUTPATIENT
Start: 2018-01-01 | End: 2018-01-01

## 2018-01-01 RX ORDER — CLOPIDOGREL 300 MG/1
300 TABLET, FILM COATED ORAL ONCE
Status: COMPLETED | OUTPATIENT
Start: 2018-01-01 | End: 2018-01-01

## 2018-01-01 RX ORDER — PROPOFOL 10 MG/ML
5 INJECTION, EMULSION INTRAVENOUS CONTINUOUS
Status: DISCONTINUED | OUTPATIENT
Start: 2018-01-01 | End: 2018-01-01

## 2018-01-01 RX ORDER — HEPARIN SODIUM,PORCINE/D5W 25000/250
12 INTRAVENOUS SOLUTION INTRAVENOUS CONTINUOUS
Status: DISCONTINUED | OUTPATIENT
Start: 2018-01-01 | End: 2018-01-01

## 2018-01-01 RX ORDER — FUROSEMIDE 10 MG/ML
40 INJECTION INTRAMUSCULAR; INTRAVENOUS ONCE
Status: COMPLETED | OUTPATIENT
Start: 2018-01-01 | End: 2018-01-01

## 2018-01-01 RX ORDER — ASPIRIN 300 MG/1
300 SUPPOSITORY RECTAL
Status: COMPLETED | OUTPATIENT
Start: 2018-01-01 | End: 2018-01-01

## 2018-01-01 RX ORDER — CEFTRIAXONE 1 G/1
1 INJECTION, POWDER, FOR SOLUTION INTRAMUSCULAR; INTRAVENOUS
Status: DISCONTINUED | OUTPATIENT
Start: 2018-01-01 | End: 2018-01-01

## 2018-01-01 RX ORDER — FENTANYL CITRATE-0.9 % NACL/PF 10 MCG/ML
PLASTIC BAG, INJECTION (ML) INTRAVENOUS CONTINUOUS
Status: DISCONTINUED | OUTPATIENT
Start: 2018-01-01 | End: 2018-01-01

## 2018-01-01 RX ORDER — FAMOTIDINE 10 MG/ML
20 INJECTION INTRAVENOUS DAILY
Status: DISCONTINUED | OUTPATIENT
Start: 2018-01-01 | End: 2018-01-01

## 2018-01-01 RX ORDER — INSULIN ASPART 100 [IU]/ML
0-5 INJECTION, SOLUTION INTRAVENOUS; SUBCUTANEOUS EVERY 6 HOURS PRN
Status: DISCONTINUED | OUTPATIENT
Start: 2018-01-01 | End: 2018-01-01

## 2018-01-01 RX ORDER — ASPIRIN 81 MG/1
81 TABLET ORAL DAILY
Status: DISCONTINUED | OUTPATIENT
Start: 2018-01-01 | End: 2018-01-01

## 2018-01-01 RX ORDER — CHLORHEXIDINE GLUCONATE ORAL RINSE 1.2 MG/ML
15 SOLUTION DENTAL 2 TIMES DAILY
Status: DISCONTINUED | OUTPATIENT
Start: 2018-01-01 | End: 2018-01-01

## 2018-01-01 RX ORDER — LIDOCAINE HYDROCHLORIDE AND EPINEPHRINE 20; 10 MG/ML; UG/ML
1 INJECTION, SOLUTION INFILTRATION; PERINEURAL ONCE
Status: COMPLETED | OUTPATIENT
Start: 2018-01-01 | End: 2018-01-01

## 2018-01-01 RX ORDER — AMIODARONE HYDROCHLORIDE 200 MG/1
400 TABLET ORAL 2 TIMES DAILY
Status: DISCONTINUED | OUTPATIENT
Start: 2018-01-01 | End: 2018-01-01

## 2018-01-01 RX ORDER — SILVER NITRATE 38.21; 12.74 MG/1; MG/1
1 STICK TOPICAL ONCE
Status: COMPLETED | OUTPATIENT
Start: 2018-01-01 | End: 2018-01-01

## 2018-01-01 RX ORDER — FUROSEMIDE 10 MG/ML
80 INJECTION INTRAMUSCULAR; INTRAVENOUS ONCE
Status: COMPLETED | OUTPATIENT
Start: 2018-01-01 | End: 2018-01-01

## 2018-01-01 RX ORDER — GLUCAGON 1 MG
1 KIT INJECTION
Status: DISCONTINUED | OUTPATIENT
Start: 2018-01-01 | End: 2018-01-01

## 2018-01-01 RX ORDER — AMIODARONE HYDROCHLORIDE 200 MG/1
400 TABLET ORAL DAILY
Status: DISCONTINUED | OUTPATIENT
Start: 2018-06-22 | End: 2018-01-01

## 2018-01-01 RX ORDER — MAGNESIUM SULFATE HEPTAHYDRATE 40 MG/ML
2 INJECTION, SOLUTION INTRAVENOUS
Status: DISCONTINUED | OUTPATIENT
Start: 2018-01-01 | End: 2018-01-01

## 2018-01-01 RX ORDER — ROCURONIUM BROMIDE 10 MG/ML
INJECTION, SOLUTION INTRAVENOUS
Status: COMPLETED
Start: 2018-01-01 | End: 2018-01-01

## 2018-01-01 RX ORDER — SODIUM CHLORIDE 0.9 % (FLUSH) 0.9 %
3 SYRINGE (ML) INJECTION
Status: DISCONTINUED | OUTPATIENT
Start: 2018-01-01 | End: 2018-01-01

## 2018-01-01 RX ORDER — CLOPIDOGREL BISULFATE 75 MG/1
75 TABLET ORAL DAILY
Status: DISCONTINUED | OUTPATIENT
Start: 2018-01-01 | End: 2018-01-01

## 2018-01-01 RX ADMIN — PROPOFOL 15 MCG/KG/MIN: 10 INJECTION, EMULSION INTRAVENOUS at 01:06

## 2018-01-01 RX ADMIN — CLOPIDOGREL 75 MG: 75 TABLET, FILM COATED ORAL at 08:06

## 2018-01-01 RX ADMIN — LIDOCAINE HYDROCHLORIDE,EPINEPHRINE BITARTRATE 1 ML: 20; .01 INJECTION, SOLUTION INFILTRATION; PERINEURAL at 05:06

## 2018-01-01 RX ADMIN — CLOPIDOGREL BISULFATE 300 MG: 300 TABLET, FILM COATED ORAL at 04:06

## 2018-01-01 RX ADMIN — ACETAMINOPHEN 650 MG: 650 SOLUTION ORAL at 06:06

## 2018-01-01 RX ADMIN — PROPOFOL 20 MCG/KG/MIN: 10 INJECTION, EMULSION INTRAVENOUS at 11:06

## 2018-01-01 RX ADMIN — INSULIN DETEMIR 15 UNITS: 100 INJECTION, SOLUTION SUBCUTANEOUS at 11:06

## 2018-01-01 RX ADMIN — ROCURONIUM BROMIDE 100 MG: 10 INJECTION, SOLUTION INTRAVENOUS at 04:06

## 2018-01-01 RX ADMIN — ACETAMINOPHEN 650 MG: 650 SOLUTION ORAL at 12:06

## 2018-01-01 RX ADMIN — CALCIUM CHLORIDE 1 G: 100 INJECTION, SOLUTION INTRAVENOUS at 02:06

## 2018-01-01 RX ADMIN — PROPOFOL 15 MCG/KG/MIN: 10 INJECTION, EMULSION INTRAVENOUS at 07:06

## 2018-01-01 RX ADMIN — AMIODARONE HYDROCHLORIDE 1 MG/MIN: 1.8 INJECTION, SOLUTION INTRAVENOUS at 01:06

## 2018-01-01 RX ADMIN — Medication 2500 MCG: at 07:06

## 2018-01-01 RX ADMIN — FAMOTIDINE 20 MG: 10 INJECTION, SOLUTION INTRAVENOUS at 08:06

## 2018-01-01 RX ADMIN — ACETAMINOPHEN 650 MG: 650 SOLUTION ORAL at 05:06

## 2018-01-01 RX ADMIN — HEPARIN SODIUM AND DEXTROSE 12 UNITS/KG/HR: 10000; 5 INJECTION INTRAVENOUS at 04:06

## 2018-01-01 RX ADMIN — AMIODARONE HYDROCHLORIDE 400 MG: 200 TABLET ORAL at 09:06

## 2018-01-01 RX ADMIN — AMIODARONE HYDROCHLORIDE 0.5 MG/MIN: 50 INJECTION, SOLUTION INTRAVENOUS at 11:06

## 2018-01-01 RX ADMIN — ATORVASTATIN CALCIUM 40 MG: 20 TABLET, FILM COATED ORAL at 08:06

## 2018-01-01 RX ADMIN — CEFTRIAXONE SODIUM 1 G: 1 INJECTION, POWDER, FOR SOLUTION INTRAMUSCULAR; INTRAVENOUS at 08:06

## 2018-01-01 RX ADMIN — CHLORHEXIDINE GLUCONATE 15 ML: 1.2 RINSE ORAL at 08:06

## 2018-01-01 RX ADMIN — PROPOFOL 15 MCG/KG/MIN: 10 INJECTION, EMULSION INTRAVENOUS at 06:06

## 2018-01-01 RX ADMIN — HEPARIN SODIUM AND DEXTROSE 8 UNITS/KG/HR: 10000; 5 INJECTION INTRAVENOUS at 07:06

## 2018-01-01 RX ADMIN — Medication 250 MCG/HR: at 07:06

## 2018-01-01 RX ADMIN — ACETAMINOPHEN 650 MG: 650 SOLUTION ORAL at 11:06

## 2018-01-01 RX ADMIN — Medication 100 MCG: at 01:06

## 2018-01-01 RX ADMIN — MAGNESIUM SULFATE IN WATER 2 G: 40 INJECTION, SOLUTION INTRAVENOUS at 06:06

## 2018-01-01 RX ADMIN — FUROSEMIDE 80 MG: 10 INJECTION, SOLUTION INTRAMUSCULAR; INTRAVENOUS at 10:06

## 2018-01-01 RX ADMIN — CHLORHEXIDINE GLUCONATE 15 ML: 1.2 RINSE ORAL at 09:06

## 2018-01-01 RX ADMIN — ASPIRIN 81 MG CHEWABLE TABLET 81 MG: 81 TABLET CHEWABLE at 08:06

## 2018-01-01 RX ADMIN — AMIODARONE HYDROCHLORIDE 1 MG/MIN: 1.8 INJECTION, SOLUTION INTRAVENOUS at 08:06

## 2018-01-01 RX ADMIN — Medication 150 MCG/HR: at 11:06

## 2018-01-01 RX ADMIN — AMIODARONE HYDROCHLORIDE 1 MG/MIN: 1.8 INJECTION, SOLUTION INTRAVENOUS at 06:06

## 2018-01-01 RX ADMIN — HEPARIN SODIUM 5000 UNITS: 5000 INJECTION, SOLUTION INTRAVENOUS; SUBCUTANEOUS at 05:06

## 2018-01-01 RX ADMIN — INSULIN ASPART 5 UNITS: 100 INJECTION, SOLUTION INTRAVENOUS; SUBCUTANEOUS at 11:06

## 2018-01-01 RX ADMIN — Medication 50 MCG: at 04:06

## 2018-01-01 RX ADMIN — FUROSEMIDE 80 MG: 10 INJECTION, SOLUTION INTRAMUSCULAR; INTRAVENOUS at 11:06

## 2018-01-01 RX ADMIN — Medication 50 MCG/HR: at 03:06

## 2018-01-01 RX ADMIN — ATORVASTATIN CALCIUM 40 MG: 20 TABLET, FILM COATED ORAL at 11:06

## 2018-01-01 RX ADMIN — HEPARIN SODIUM 5000 UNITS: 5000 INJECTION, SOLUTION INTRAVENOUS; SUBCUTANEOUS at 09:06

## 2018-01-01 RX ADMIN — EPINEPHRINE 1 MG: 0.1 INJECTION, SOLUTION ENDOTRACHEAL; INTRACARDIAC; INTRAVENOUS at 02:06

## 2018-01-01 RX ADMIN — ATROPINE SULFATE 1 MG: 0.1 INJECTION, SOLUTION INTRAVENOUS at 02:06

## 2018-01-01 RX ADMIN — SODIUM BICARBONATE 50 MEQ: 84 INJECTION, SOLUTION INTRAVENOUS at 02:06

## 2018-01-01 RX ADMIN — HEPARIN SODIUM AND DEXTROSE 15 UNITS/KG/HR: 10000; 5 INJECTION INTRAVENOUS at 04:06

## 2018-01-01 RX ADMIN — AMIODARONE HYDROCHLORIDE 400 MG: 200 TABLET ORAL at 01:06

## 2018-01-01 RX ADMIN — ACETAMINOPHEN 650 MG: 650 SOLUTION ORAL at 01:06

## 2018-01-01 RX ADMIN — PROPOFOL 10 MCG/KG/MIN: 10 INJECTION, EMULSION INTRAVENOUS at 06:06

## 2018-01-01 RX ADMIN — MORPHINE SULFATE 10 MG/HR: 10 INJECTION INTRAMUSCULAR; INTRAVENOUS; SUBCUTANEOUS at 01:06

## 2018-01-01 RX ADMIN — AMIODARONE HYDROCHLORIDE 1 MG/MIN: 1.8 INJECTION, SOLUTION INTRAVENOUS at 03:06

## 2018-01-01 RX ADMIN — ASPIRIN 300 MG: 300 SUPPOSITORY RECTAL at 02:06

## 2018-01-01 RX ADMIN — PROPOFOL 15 MCG/KG/MIN: 10 INJECTION, EMULSION INTRAVENOUS at 09:06

## 2018-01-01 RX ADMIN — AMIODARONE HYDROCHLORIDE 400 MG: 200 TABLET ORAL at 08:06

## 2018-01-01 RX ADMIN — LORAZEPAM 2 MG: 2 INJECTION INTRAMUSCULAR; INTRAVENOUS at 01:06

## 2018-01-01 RX ADMIN — HEPARIN SODIUM AND DEXTROSE 12 UNITS/KG/HR: 10000; 5 INJECTION INTRAVENOUS at 06:06

## 2018-01-01 RX ADMIN — HEPARIN SODIUM 5000 UNITS: 5000 INJECTION, SOLUTION INTRAVENOUS; SUBCUTANEOUS at 01:06

## 2018-01-01 RX ADMIN — FUROSEMIDE 40 MG: 10 INJECTION, SOLUTION INTRAMUSCULAR; INTRAVENOUS at 11:06

## 2018-01-01 RX ADMIN — BUSPIRONE HYDROCHLORIDE 30 MG: 10 TABLET ORAL at 06:06

## 2018-01-01 RX ADMIN — ASPIRIN 81 MG: 81 TABLET, COATED ORAL at 08:06

## 2018-01-01 RX ADMIN — SILVER NITRATE APPLICATORS 1 APPLICATOR: 25; 75 STICK TOPICAL at 04:06

## 2018-01-01 RX ADMIN — INSULIN ASPART 3 UNITS: 100 INJECTION, SOLUTION INTRAVENOUS; SUBCUTANEOUS at 06:06

## 2018-06-10 PROBLEM — I50.22 CHRONIC SYSTOLIC HEART FAILURE: Status: ACTIVE | Noted: 2018-01-01

## 2018-06-10 PROBLEM — Z99.11 ON MECHANICALLY ASSISTED VENTILATION: Status: ACTIVE | Noted: 2018-01-01

## 2018-06-10 PROBLEM — I46.9 CARDIAC ARREST: Status: ACTIVE | Noted: 2018-01-01

## 2018-06-10 PROBLEM — D72.829 LEUKOCYTOSIS: Status: ACTIVE | Noted: 2018-01-01

## 2018-06-10 PROBLEM — I49.01 CARDIAC ARREST WITH VENTRICULAR FIBRILLATION: Status: ACTIVE | Noted: 2018-01-01

## 2018-06-10 PROBLEM — I25.810 CORONARY ARTERY DISEASE INVOLVING AUTOLOGOUS ARTERY CORONARY BYPASS GRAFT: Status: ACTIVE | Noted: 2018-01-01

## 2018-06-10 PROBLEM — I21.3 STEMI (ST ELEVATION MYOCARDIAL INFARCTION): Status: ACTIVE | Noted: 2018-01-01

## 2018-06-10 PROBLEM — I24.9 ACUTE CORONARY SYNDROME: Status: ACTIVE | Noted: 2018-01-01

## 2018-06-10 NOTE — PLAN OF CARE
Problem: Patient Care Overview  Goal: Plan of Care Review  Outcome: Ongoing (interventions implemented as appropriate)  Recommendations     1. TF recommendations: Impact Peptide @ 50 mL/hr to provide 1800 calories (102% EEN), 113 g of protein (100% EPN), 924 mL fluid.               - Hold for residuals >500 mL; additional fluid per MD.   2. If able to extubate & advance diet, recommend cardiac diet (texture per SLP).   3. RD to monitor & follow-up.

## 2018-06-10 NOTE — HPI
This is Mr. Darren Villanueva, 83 year old male with PMHx significant for extensive cardiac history including CAD involving autologous artery coronary bypass graft with previous Triple bypass surgery, Pacemaker, dyslipidemia and diabetes mellitus. He was brought in to [facility] ED by EMS after found to have cardiac arrest occurred at home at 0300, when the family emergency called EMS, and started CPR in Ambulance and transferred him to [facility] ED, rhythm was V Fibrillation and received shock and eventually achieved ROCS, intubated and had an ECG that showed diffuse ST depression. Cardiology consulted given his underlying etiology of cardiac arrest could be ischemic in etiology, however, decision was made to proceed MICU admission and consult cardiology for possible intervention. Of note, per son and niece, Mr. Darren Villanueva was having symptoms of chest pain and anginal pain on physical exertion, and estimated NYHA for him ~ class II. His cardiac care mostly in [facility], where he was following after his CABG ~ 7 years ago, and recently had dual chamber pacemaker.

## 2018-06-10 NOTE — PROGRESS NOTES
Pt temp increasing over the last few hours. Pt now reach 36 degrees celsius. Zoll turned on to maintain pt at 36 degrees per orders.

## 2018-06-10 NOTE — ASSESSMENT & PLAN NOTE
- Intubated during the code, currently on   Vent Mode: A/C  Oxygen Concentration (%):  [] 100  Resp Rate Total:  [18 br/min-26 br/min] 20 br/min  Vt Set:  [450 mL-500 mL] 450 mL  PEEP/CPAP:  [5 cmH20] 5 cmH20  Pressure Support:  [0 cmH20] 0 cmH20  Mean Airway Pressure:  [3.4 [id number] cmH20] 11 cmH20

## 2018-06-10 NOTE — PROGRESS NOTES
Pt arrived to CMICU room 3073, arrived from ED with Zoll cath in place s/p cardiac arrest. Pt is self-cooling (temp 34.7 degrees C) MD Vega w/CCS present and aware. Machine placed on standby. Will use to maintain goal temp of 36 degrees. Pt Intubated/sedated. VSS for pt condition on monitor. Pt arrived with Amio, Heparin and Fentanyl infusing. Cough/gag/corneal reflexes intact. Family present in waiting room.

## 2018-06-10 NOTE — CONSULTS
Thanks for the consult, after discussion with cardiology and ED staff, the plan to admit Mr. Darren Villanueva to Medical ICU. Please see following H&P.

## 2018-06-10 NOTE — ASSESSMENT & PLAN NOTE
- Etiology is most likely Nonprimary VF related to ischemic episode as his symptoms preceding the code and ECG finding of diffuse ST depression   - Cardiology consulted, and will treat him medically with DAPT, Heparin and consult interventional cardiology for intervention (coronary angiography).   - Will hold on Therapeutic Hypothermia for now, given possible plan for coronary angiography

## 2018-06-10 NOTE — ASSESSMENT & PLAN NOTE
- Ischemic in nature given his history of CAD  - Bedside echo showed EF ~ 30%, will repeat 2D Echo with doppler

## 2018-06-10 NOTE — ASSESSMENT & PLAN NOTE
- Significant history of Coronary artery disease and CABG ~ 7 years ago, and ischemic HFrEF   - TRINY score is 7 points 41% risk at 14 days of: all-cause mortality  - Will proceed with medical management with DAPT and Heparin and cardiology consult

## 2018-06-10 NOTE — PROGRESS NOTES
Pt returned from CT scan, no acute events during transport. VSS. Zoll cath reattached and machine placed back on standby. Bladder temp 35.1 via Zoll. Pt still self-cooling.

## 2018-06-10 NOTE — SUBJECTIVE & OBJECTIVE
Past Medical History:   Diagnosis Date    Arrhythmia, atrial     Hyperlipidemia     Hypertension     Renal failure, unspecified        Past Surgical History:   Procedure Laterality Date    Aortocoronary Bypass         Review of patient's allergies indicates:  No Known Allergies    Family History     None        Social History Main Topics    Smoking status: Never Smoker    Smokeless tobacco: Not on file    Alcohol use No    Drug use: No    Sexual activity: Not on file      Review of Systems   Unable to perform ROS: Intubated     Objective:     Vital Signs (Most Recent):  Temp: 97.5 °F (36.4 °C) (06/10/18 0335)  Pulse: 75 (06/10/18 0438)  Resp: 20 (06/10/18 0438)  BP: (!) 103/58 (06/10/18 0438)  SpO2: (!) 94 % (06/10/18 0438) Vital Signs (24h Range):  Temp:  [97.5 °F (36.4 °C)] 97.5 °F (36.4 °C)  Pulse:  [41-86] 75  Resp:  [17-24] 20  SpO2:  [94 %-100 %] 94 %  BP: (103-170)/(58-87) 103/58   Weight: 95.3 kg (210 lb)  Body mass index is 30.13 kg/m².    No intake or output data in the 24 hours ending 06/10/18 0448    Physical Exam   Constitutional:   Mechanical ventilation    HENT:   Head: Normocephalic and atraumatic.   Mouth/Throat: No oropharyngeal exudate.   Eyes: Pupils are equal, round, and reactive to light. Right eye exhibits no discharge. Left eye exhibits no discharge.   Neck: Normal range of motion. No JVD present. No thyromegaly present.   Cardiovascular: Normal rate and regular rhythm.  Exam reveals no friction rub.    Murmur heard.  V paced rhythm    Pulmonary/Chest: Effort normal. No respiratory distress. He has wheezes. He has rales.   Abdominal: Soft. Bowel sounds are normal. He exhibits no distension. There is no tenderness.   Musculoskeletal: Normal range of motion. He exhibits edema. He exhibits no deformity.   Neurological:   Sedated (fentanyl)        Vents:  Vent Mode: A/C (06/10/18 0415)  Ventilator Initiated: Yes (06/10/18 0249)  Set Rate: 20 bmp (06/10/18 0415)  Vt Set: 450 mL (06/10/18  0415)  Pressure Support: 0 cmH20 (06/10/18 0415)  PEEP/CPAP: 5 cmH20 (06/10/18 0415)  Oxygen Concentration (%): 100 (06/10/18 0415)  Peak Airway Pressure: 15 cmH2O (06/10/18 0415)  Plateau Pressure: 0 cmH20 (06/10/18 0415)  Total Ve: 5.53 mL (06/10/18 0415)  F/VT Ratio<105 (RSBI): (!) 49.26 (06/10/18 0415)  Lines/Drains/Airways     Drain                 NG/OG Tube 06/10/18 0429 orogastric 18 Fr. Center mouth less than 1 day         Urethral Catheter 06/10/18 0255 Temperature probe less than 1 day          Airway                 Airway - Non-Surgical 06/10/18 0415 Endotracheal Tube less than 1 day          Intraosseous Line                 Intraosseous Line 06/10/18 0210 Tibia less than 1 day          Peripheral Intravenous Line                 Peripheral IV - Single Lumen 06/10/18 0215 Right Upper Arm less than 1 day         Peripheral IV - Single Lumen 06/10/18 0243 Left Upper Arm less than 1 day              Significant Labs:    CBC/Anemia Profile:    Recent Labs  Lab 06/10/18  0232   WBC 20.86*   HGB 11.2*   HCT 38.2*      *   RDW 13.4        Chemistries:    Recent Labs  Lab 06/10/18  0232   *   K 4.1      CO2 14*   BUN 39*   CREATININE 3.2*   CALCIUM 10.1   ALBUMIN 3.1*   PROT 7.1   BILITOT 0.3   ALKPHOS 134   ALT 92*   AST 73*   MG 2.1   PHOS 8.2*       BMP:   Recent Labs  Lab 06/10/18  0232   *   *   K 4.1      CO2 14*   BUN 39*   CREATININE 3.2*   CALCIUM 10.1   MG 2.1     CMP:   Recent Labs  Lab 06/10/18  0232   *   K 4.1      CO2 14*   *   BUN 39*   CREATININE 3.2*   CALCIUM 10.1   PROT 7.1   ALBUMIN 3.1*   BILITOT 0.3   ALKPHOS 134   AST 73*   ALT 92*   ANIONGAP 28*   EGFRNONAA 17.0*     Cardiac Markers: No results for input(s): CKMB, TROPONINT, MYOGLOBIN in the last 48 hours.  Coagulation:   Recent Labs  Lab 06/10/18  0232   INR 1.1   APTT <21.0     Lactic Acid:   Recent Labs  Lab 06/10/18  0232   LACTATE >12.0*       Significant Imaging: I  have reviewed all pertinent imaging results/findings within the past 24 hours.  I have reviewed and interpreted all pertinent imaging results/findings within the past 24 hours.

## 2018-06-10 NOTE — PLAN OF CARE
Problem: Patient Care Overview  Goal: Plan of Care Review  Outcome: Ongoing (interventions implemented as appropriate)  Pt remains intubated and sedated on Fentanyl gtt. Zoll cath in place to right groin. Pt maintaining 36 degrees celsius. Central line dressing changed and surgicel placed to site r/t oozing. Heparin gtt infusing per orders. AntiXa's remain slightly elevated. Weaning gtt per nomogram. Pt 100% paced in the 60's. Amio gtt infusing. Pt has small amt of blood tinged secretions from ETT/oral airway. New since 1745 -pt clenching jaw. Pupils remain equal and round, sluggish reaction to light. Facial grimace noted. Triturating Fentanyl gtt for comfort. Spontaneous jerking movement/tremors noted to Left hand. Pt arms rigid bilaterally. MD Vega w/CCS called to notify of pt change. Dyssynchrony noted on vent. Per MD will start Propofol gtt for sedation. Plan to maintain 36 degrees for 24hr and re-warm tomorrow afternoon. Urine out decreased since admit- lasix given. Pt NPO. Continuing to trend labs Q4hrs. Creatinine elevated. Records sent from [facility] where pt goes for primary care. MD Vega present to review. Family remains at bedside. MD Vega updated family. POC discussed. Questions answered and needs addressed.

## 2018-06-10 NOTE — ED PROVIDER NOTES
Encounter Date: 6/10/2018       History     Chief Complaint   Patient presents with    Cardiac Arrest     HPI     Darren Villanueva is a 83 y.o. male w/ hx of chronic kidney disease, CAD status post triple bypass about 7 or 8 years ago, aortic valvular disease, hypertension and diabetes, brought in by EMS with ongoing cardiac arrest.  Per EMS and family, patient was walking into account, complaining of shortness of breath and sat down.  He then clutched his chest, said that it hurts and asked family to bring him to the hospital.  Family laid him the floor and called EMS.  He went unresponsive but no CPR was performed.  After EMS arrived, ACLS was started.  Initial rhythm was thought to be PTA. He was given 3 doses of epi, 3 have mg of amiodarone, 1 bicarb.  He was also defibrillated once en route for thought to be V-tach.  EMS achieved ROSC but he lost pulses again when arriving to ED.    In the ED, as he initially had vfib, shocked once.  Subsequent pulse check demonstrated PEA.  He went through multiple rounds of compression, was given multiple doses of epi, bicarb and calcium.  ROSC eventually was achieved.  Patient was then started on amiodarone drip.  He became slightly bradycardic afterwards, was given atropine which he responded well to.    Initial EMS EKG was concerning for STEMI as he has ST elevation in 2-3 AVF and lateral leads.  Repeat EKG 12 leads after ROSC showed paced rhythm with no significant ST morphology.    Review of patient's allergies indicates:  No Known Allergies  Past Medical History:   Diagnosis Date    Arrhythmia, atrial     Hyperlipidemia     Hypertension     Renal failure, unspecified      Past Surgical History:   Procedure Laterality Date    Aortocoronary Bypass       No family history on file.  Social History   Substance Use Topics    Smoking status: Never Smoker    Smokeless tobacco: Not on file    Alcohol use No     Review of Systems   Unable to perform ROS: Acuity of  condition       Physical Exam     Initial Vitals   BP Pulse Resp Temp SpO2   06/10/18 0233 06/10/18 0233 06/10/18 0233 06/10/18 0335 06/10/18 0233   (!) 151/67 (!) 41 20 97.5 °F (36.4 °C) 99 %      MAP       06/10/18 0233       95         Physical Exam    Constitutional: Pulses: No pulses palpable. Airway: Other present. Level of Consciousness: Unresponsive.   HENT:   Head: No head trauma present.   Eyes: Pupils: Normal pupils.   Cardiovascular: CPR in Progress. Heart Sounds: None. There is Arden device in place.  Pulmonary/Chest: Respirations: None. Ventilations: Bag-Valve-Mask.   Musculoskeletal: No extremity trauma present. No pedal edema present.   Neurological: Unresponsive to stimuli.         ED Course   Intubation  Date/Time: 6/10/2018 3:00 AM  Location procedure was performed: [facility] EMERGENCY DEPARTMENT  Performed by: SEBASTIÁN SPICER  Authorized by: DONNA STOUT   Assisting provider: DONNA STOUT  Consent Done: Emergent Situation  Indications: respiratory failure  Intubation method: video-assisted  Patient status: unconscious  Preoxygenation: TATE/LMA  Pretreatment medications: fentanyl  Paralytic: rocuronium  Laryngoscope size: Mac 4  Tube size: 7.5 mm  Tube type: cuffed  Number of attempts: 1  Cricoid pressure: no  Cords visualized: yes  Post-procedure assessment: ETCO2 monitor,  CO2 detector and chest rise  Breath sounds: clear  Cuff inflated: yes  ETT to teeth: 27 cm  Tube secured with: ETT king  Chest x-ray interpreted by me.  Chest x-ray findings: endotracheal tube in appropriate position  Patient tolerance: Patient tolerated the procedure well with no immediate complications  Complications: No        Labs Reviewed   CBC W/ AUTO DIFFERENTIAL - Abnormal; Notable for the following:        Result Value    WBC 20.86 (*)     RBC 3.64 (*)     Hemoglobin 11.2 (*)     Hematocrit 38.2 (*)      (*)     MCHC 29.3 (*)     Lymph% 54.0 (*)     All other components within normal limits    COMPREHENSIVE METABOLIC PANEL - Abnormal; Notable for the following:     Sodium 148 (*)     CO2 14 (*)     Glucose 244 (*)     BUN, Bld 39 (*)     Creatinine 3.2 (*)     Albumin 3.1 (*)     AST 73 (*)     ALT 92 (*)     Anion Gap 28 (*)     eGFR if  19.6 (*)     eGFR if non  17.0 (*)     All other components within normal limits   LACTIC ACID, PLASMA - Abnormal; Notable for the following:     Lactate (Lactic Acid) >12.0 (*)     All other components within normal limits   TROPONIN I - Abnormal; Notable for the following:     Troponin I 0.065 (*)     All other components within normal limits   PHOSPHORUS - Abnormal; Notable for the following:     Phosphorus 8.2 (*)     All other components within normal limits   COMPREHENSIVE METABOLIC PANEL - Abnormal; Notable for the following:     Potassium 5.8 (*)     Glucose 305 (*)     BUN, Bld 41 (*)     Creatinine 3.2 (*)     Albumin 2.9 (*)     Alkaline Phosphatase 158 (*)      (*)      (*)     eGFR if  19.6 (*)     eGFR if non  17.0 (*)     All other components within normal limits   PHOSPHORUS - Abnormal; Notable for the following:     Phosphorus 6.5 (*)     All other components within normal limits   CBC W/ AUTO DIFFERENTIAL - Abnormal; Notable for the following:     WBC 29.64 (*)     RBC 3.33 (*)     Hemoglobin 10.4 (*)     Hematocrit 32.6 (*)     MCH 31.2 (*)     MCHC 31.9 (*)     Immature Granulocytes 1.7 (*)     Gran # (ANC) 26.8 (*)     Immature Grans (Abs) 0.50 (*)     Mono # 1.3 (*)     Gran% 90.4 (*)     Lymph% 3.2 (*)     All other components within normal limits   HEMOGLOBIN A1C - Abnormal; Notable for the following:     Hemoglobin A1C 6.7 (*)     Estimated Avg Glucose 146 (*)     All other components within normal limits    Narrative:     add on per Dr Hewitt order #[id number] and #[id number] 06/10/2018    04:09 GHGB and APTT   COMPREHENSIVE METABOLIC PANEL - Abnormal; Notable for the  following:     Potassium 5.5 (*)     Glucose 315 (*)     BUN, Bld 42 (*)     Creatinine 3.2 (*)     Albumin 2.9 (*)     Alkaline Phosphatase 157 (*)      (*)      (*)     eGFR if  19.6 (*)     eGFR if non  17.0 (*)     All other components within normal limits    Narrative:     Obtain from central line or arterial line as peripheral  checks may be inaccurate.   CK - Abnormal; Notable for the following:     CPK 1134 (*)     All other components within normal limits    Narrative:     Obtain from central line or arterial line as peripheral  checks may be inaccurate.   URINALYSIS - Abnormal; Notable for the following:     Appearance, UA Cloudy (*)     Protein, UA 3+ (*)     Glucose, UA 2+ (*)     Occult Blood UA 1+ (*)     All other components within normal limits   GLUCOSE, RANDOM - Abnormal; Notable for the following:     Glucose 315 (*)     All other components within normal limits    Narrative:     Obtain from central line or arterial line as peripheral  checks may be inaccurate.   GLUCOSE, RANDOM - Abnormal; Notable for the following:     Glucose 315 (*)     All other components within normal limits    Narrative:     Obtain from central line or arterial line as peripheral  checks may be inaccurate.   URINALYSIS MICROSCOPIC - Abnormal; Notable for the following:     RBC, UA 7 (*)     WBC, UA 6 (*)     Bacteria, UA Moderate (*)     Amorphous, UA Many (*)     All other components within normal limits   POCT GLUCOSE - Abnormal; Notable for the following:     POCT Glucose 335 (*)     All other components within normal limits   ISTAT PROCEDURE - Abnormal; Notable for the following:     POC PH 7.027 (*)     POC PCO2 81.1 (*)     POC PO2 39 (*)     POC HCO3 21.3 (*)     POC SATURATED O2 48 (*)     All other components within normal limits   ISTAT PROCEDURE - Abnormal; Notable for the following:     POC PH 7.249 (*)     POC PCO2 54.5 (*)     POC PO2 327 (*)     POC HCO3 23.9 (*)      POC Lactate 10.93 (*)     All other components within normal limits   ISTAT PROCEDURE - Abnormal; Notable for the following:     POC PCO2 47.3 (*)     POC PO2 76 (*)     POC HCO3 28.4 (*)     POC TCO2 30 (*)     All other components within normal limits   CULTURE, BLOOD   CULTURE, BLOOD   MAGNESIUM   PROTIME-INR   CALCIUM, IONIZED   MAGNESIUM   PROTIME-INR   APTT   HEMOGLOBIN A1C   APTT    Narrative:     add on per Dr Hewitt order #[id number] and #[id number] 06/10/2018    04:09 GHGB and APTT   URINALYSIS   NEURON SPECIFIC ENOLASE, SERUM   ANTI-XA HEPARIN MONITORING   LACTIC ACID, PLASMA   LACTIC ACID, PLASMA   LACTIC ACID, PLASMA   GLUCOSE, RANDOM   GLUCOSE, RANDOM   GLUCOSE, RANDOM   BASIC METABOLIC PANEL   BASIC METABOLIC PANEL   BASIC METABOLIC PANEL   PHOSPHORUS   PHOSPHORUS   PHOSPHORUS   TROPONIN I   TROPONIN I   ISTAT PROCEDURE   POCT GLUCOSE MONITORING CONTINUOUS          X-Ray Chest AP Portable   Final Result      As above.         Electronically signed by: Shirley Avery MD   Date:    06/10/2018   Time:    05:06      X-Ray Chest AP Portable   Final Result      Cardiomegaly with pulmonary vascular congestion and bilateral edema.  Pacer present.      Platelike atelectasis left base.         Electronically signed by: Sanjeev Lowe MD   Date:    06/10/2018   Time:    02:59      X-Ray Chest 1 View    (Results Pending)   X-Ray Abdomen AP 1 View (KUB)    (Results Pending)   CT Head Without Contrast    (Results Pending)              APC / Resident Notes:   MDM   MDM - PGY2  Darren Villanueva is a 83 y.o. male w/ hx of chronic kidney disease, CAD status post triple bypass about 7 or 8 years ago, aortic valvular disease, hypertension and diabetes, brought in by EMS with ongoing cardiac arrest.    Initial presentation and EMS EKG concerns for STEMI.  Repeat EKG after ROSC with paced rhythm and no ST changes.  Bedside ultrasound after her LISA shows decreased LV function, around 20-25%.  No RV dilation.  No pericardial  effusion.    Cardiology was counseled and evaluated the patient at bedside.  Per Cardiology recommendation, patient has significant comorbidities, very significant cardiac history, likely prolonged hypoperfusion resulting in multiorgan system dysfunction and significant neurological injury, decision was made to defer emergent intervention at this time.  Recommend treating ACS with aspirin, Plavix and heparin.  Will need TTE and cooling.  Recommend admitting to ICU.    Patient initially arrived with LMA.  He became slightly desaturated to the 90% after ROSC.  Airway was switched to ETT with vidal.  NG tube was placed.    ICU was consulted and admitted the patient.    --- Joelle Mitchell - PGY2 EM -- 06/10/2018 8:04 AM ---              Clinical Impression:   Diagnoses of Cardiac arrest, Encounter for intubation, Encounter for nasogastric (NG) tube placement, and Cardiac arrest with ventricular fibrillation were pertinent to this visit.                             Joelle Mitchell MD  Resident  06/10/18 6315

## 2018-06-10 NOTE — PROCEDURES
"Darren Villanueva is a 83 y.o. male patient.    Temp: (!) 93.9 °F (34.4 °C) (06/10/18 0626)  Pulse: 60 (06/10/18 0626)  Resp: 20 (06/10/18 0626)  BP: 105/62 (06/10/18 0626)  SpO2: (!) 94 % (06/10/18 0626)  Weight: 95.3 kg (210 lb) (06/10/18 0339)  Height: 5' 10" (177.8 cm) (06/10/18 0339)       Central Line  Date/Time: 6/10/2018 6:33 AM  Location procedure was performed: [facility] CRITICAL CARE MEDICINE  Performed by: MACRINA ALMONTE  Assisting provider: KASANDRA JACKSON  Pre-operative Diagnosis: Cardiac arrest  Post-operative diagnosis: Cardiac arres  Consent Done: Yes  Time out: Immediately prior to procedure a "time out" was called to verify the correct patient, procedure, equipment, support staff and site/side marked as required.  Indications: hemodynamic monitoring  Anesthesia: see MAR for details  Description of findings: Right Fem shalini   Preparation: skin prepped with ChloraPrep  Skin prep agent dried: skin prep agent completely dried prior to procedure  Sterile barriers: all five maximum sterile barriers used - cap, mask, sterile gown, sterile gloves, and large sterile sheet  Hand hygiene: hand hygiene performed prior to central venous catheter insertion  Location details: right femoral  Site selection rationale: ZOLL for theraputic hypothermia  Ultrasound guidance: yes  Vessel Caliber: medium, compressibility normal  Needle advanced into vessel with real time Ultrasound guidance.  Guidewire confirmed in vessel.  Image recorded and saved.  Number of attempts: 1  Specimens: No  Implants: No  Post-procedure: line sutured  Complications: No          Macrina Almonte  6/10/2018  "

## 2018-06-10 NOTE — CONSULTS
[facility]  Adult Nutrition  Consult Note    SUMMARY     Recommendations    1. TF recommendations: Impact Peptide @ 50 mL/hr to provide 1800 calories (102% EEN), 113 g of protein (100% EPN), 924 mL fluid.    - Hold for residuals >500 mL; additional fluid per MD.   2. If able to extubate & advance diet, recommend cardiac diet (texture per SLP).   3. RD to monitor & follow-up.    Goals: Meet % EEN, EPN  Nutrition Goal Status: new  Communication of RD Recs: reviewed with RN    Reason for Assessment    Reason for Assessment: consult  Diagnosis: other (see comments) (Cardiac arrest)  Relevant Medical History: HTN, HLD, DM, CAD, CKD  Interdisciplinary Rounds: attended    General Information Comments: Pt intubated w/ no family at bedside.  Nutrition Discharge Planning: Unable to determine    Nutrition/Diet History    Patient Reported Diet/Restrictions/Preferences: other (see comments) (RONAN)  Do you have any cultural, spiritual, Spiritism conflicts, given your current situation?: none  Factors Affecting Nutritional Intake: NPO, on mechanical ventilation    Anthropometrics    Temp: (!) 94.3 °F (34.6 °C)  Height Method: Estimated  Height: 6' (182.9 cm)  Height (inches): 72 in  Weight Method: Bed Scale  Weight: 89.7 kg (197 lb 12 oz)  Weight (lb): 197.75 lb  Ideal Body Weight (IBW), Male: 178 lb  % Ideal Body Weight, Male (lb): 111.1 lb  BMI (Calculated): 26.9  BMI Grade: 25 - 29.9 - overweight  Usual Body Weight (UBW), kg:  (RONAN)    Lab/Procedures/Meds    Pertinent Labs Reviewed: reviewed  Pertinent Labs Comments: K 5.5, BUN 42, Creat 3.2, GFR 17, Gluc 315, A1c 6.7  Pertinent Medications Reviewed: reviewed  Pertinent Medications Comments: Amiodarone, Fentanyl, Heparin    Physical Findings/Assessment    Overall Physical Appearance: nourished, on ventilator support  Tubes: orogastric tube  Oral/Mouth Cavity: WDL  Skin: intact    Estimated/Assessed Needs    Weight Used For Calorie Calculations: 89.7  kg (197 lb 12 oz)     Energy Calorie Requirements (kcal): 1759 kcal/d  Energy Need Method: [facility]     Protein Requirements: 108-135 g/d (1.2-1.5 g/kg)  Weight Used For Protein Calculations: 89.7 kg (197 lb 12 oz)     Fluid Need Method: other (see comments) (Per MD or 1 mL/kcal)     CHO Requirement: 50% total kcals    Nutrition Prescription Ordered    Current Diet Order: NPO    Nutrition Risk    Level of Risk/Frequency of Follow-up: high     Assessment and Plan    * Cardiac arrest with ventricular fibrillation      Nutrition Problem  Inadequate energy intake    Related to (etiology):   Inability to consume sufficient energy    Signs and Symptoms (as evidenced by):   NPO with no alternate means of nutrition     Nutrition Diagnosis Status:   New         Monitor and Evaluation    Food and Nutrient Intake: energy intake, food and beverage intake, enteral nutrition intake  Food and Nutrient Adminstration: diet order, enteral and parenteral nutrition administration  Physical Activity and Function: nutrition-related ADLs and IADLs  Anthropometric Measurements: weight, weight change  Biochemical Data, Medical Tests and Procedures: lipid profile, inflammatory profile, glucose/endocrine profile, gastrointestinal profile, electrolyte and renal panel  Nutrition-Focused Physical Findings: overall appearance     Nutrition Follow-Up    RD Follow-up?: Yes

## 2018-06-10 NOTE — ASSESSMENT & PLAN NOTE
- He is on Insulin glargine for his diabetes   - Will hold on medication in current setting and will continue to manage his diabetes per target for ICU patient 140-180

## 2018-06-10 NOTE — CONSULTS
[facility]  Cardiology Consult Note    Attending Physician: Lopez Hewitt MD  Reason for Consult: Cardiac Arrest    HPI:   Mr. Villanueva is an 83 y.o. Man with chronic kidney disease, coronary artery disease s/p CABG, Hypertension, aortic valvular disease, and diabetes - who presents post cardiac arrest.  Per his family he had megha complaining of some shortness of breath and chest discomfort the past several days.  He went to bed this evening without event, then got up shortly therafter came to the couch and sat down, complained of shortness of breath and then lost consciousness.  Family witnessed the event but no CPR was performed, EMS called.  When EMS arrived CPR was begun, at some point he had PEA.  EMS also performed several defibrillations for what was thought to be VT, he was given epinephrine x 2, bicarb, and amiodarone with ROSC.  In the ED he lost a pulse again, initially possibly VF - shocked, return to sinus but still no pulse thus PEA, ultimately recovered pulse after another round of compressions.    In the ED he was non-responsive. Initially there was some concern for STEMI on EMS EKG, however EKG later when he was paced demonstrated this was a similar morphology.  Native EKG with ST depressions in inferior and lateral leads.  Elevations in V2, however he also had RBBB and no prior EKGs to compare with.  Subsequent EKG with similar morphology.  Initial blood work remarkable for acidosis, lactic acid of 12, pH 7.2, white count of 20, Creatinine of 3.2, mild AST/ALT elevation.  He remained non-responsive, but pupils reactive and he retained cough reflex.  No immediate pressor requirement, vent at 60% FiO2.    Review of Systems   Review of Systems   Unable to perform ROS: mental status change     PMH:     Past Medical History:   Diagnosis Date    Arrhythmia, atrial     Hyperlipidemia     Hypertension     Renal failure, unspecified      Past Surgical History:   Procedure Laterality Date     Aortocoronary Bypass          Allergies:     Review of patient's allergies indicates:  No Known Allergies     Medications:     No current facility-administered medications on file prior to encounter.      Current Outpatient Prescriptions on File Prior to Encounter   Medication Sig Dispense Refill    amlodipine (NORVASC) 10 MG tablet Take 10 mg by mouth once daily.      atorvastatin (LIPITOR) 20 MG tablet Take 20 mg by mouth every evening.       calcitRIOL (ROCALTROL) 0.25 MCG Cap Take 0.25 mcg by mouth once daily.      carvedilol (COREG) 6.25 MG tablet Take 6.25 mg by mouth 2 (two) times daily with meals.      clonidine (CATAPRES) 0.1 MG tablet Take 0.1 mg by mouth 2 (two) times daily.      hydrALAZINE (APRESOLINE) 25 MG tablet Take 25 mg by mouth 3 (three) times daily.      insulin glargine (LANTUS) 100 unit/mL Crtg Inject 33 Units/mL into the skin Daily.      multivitamin (ONE DAILY MULTIVITAMIN) per tablet Take 1 tablet by mouth once daily.          Social History:     Social History   Substance Use Topics    Smoking status: Never Smoker    Smokeless tobacco: Not on file    Alcohol use No        Family History:     No family history on file.     Physical Exam:     Vitals:  Temp:  [97.5 °F (36.4 °C)]   Pulse:  [41-86]   Resp:  [17-24]   BP: (119-170)/(58-87)   SpO2:  [96 %-100 %]   I/O's:  No intake or output data in the 24 hours ending 06/10/18 6966     Physical Exam   Constitutional: He appears well-nourished. No distress.   Elderly man intubated.   Eyes: Right eye exhibits no discharge. No scleral icterus.   Neck: No tracheal deviation present. No thyromegaly present.   Cardiovascular: Normal rate and regular rhythm.    Murmur (soft systolic murmur) heard.  Pulmonary/Chest:   Bilateral mechanically ventilated breath sounds   Abdominal: Soft. He exhibits no distension and no mass. There is no guarding.   Musculoskeletal: He exhibits edema (trace ankles).   Neurological:   As above, non-reponsive    Skin: Skin is warm and dry. He is not diaphoretic. No erythema.       Labs:     Recent Results (from the past 336 hour(s))   CBC auto differential    Collection Time: 06/10/18  2:32 AM   Result Value Ref Range    WBC 20.86 (H) 3.90 - 12.70 K/uL    Hemoglobin 11.2 (L) 14.0 - 18.0 g/dL    Hematocrit 38.2 (L) 40.0 - 54.0 %    Platelets 162 150 - 350 K/uL       Recent Labs  Lab 06/10/18  0232   TROPONINI 0.065*       Lab Results   Component Value Date    HGBA1C 6.7 (H) 06/10/2018       Estimated Creatinine Clearance: 20.3 mL/min (A) (based on SCr of 3.2 mg/dL (H)).    Imaging:  CXR: Congestion    Echo:  Pending    EKG:   As above - intermittently paced, native QRS with ST depressions in inferior and lateral leads, elevation in V2 with RBBB.      Telemetry:   Interrogation of Pacemaker with normal parameters, no change in threshold or impedence, RV sensing value decreased then normalized.    Assessment & Recommendations:   Mr. Villanueva is an 83 y.o. Man with chronic kidney disease, coronary artery disease s/p CABG, Hypertension, aortic valvular disease, and diabetes - who presents post cardiac arrest.      #Cardiac Arrest/Coronary Artery Disease - Mr. Villanueva has significant ischemia on EKG, however no clear ST elevations.  Currently he is suffering from multiorgan system dysfunction in the setting of prolonged hypoperfusion after multiple rounds of CPR - Neurological injury, acidosis, renal injury.  Hemodynamically however doing ok, not requiring any subsequent vasopressors or mechanical support. He likely had some type of ACS event triggering this current cardiac arrest, however currently the organ dysfunction appears a bigger issue as he is not requiring any inotropic support.  Given age, duration of code, and acidosis Mr. Villanueva has a high CAHP score and the likelihood of benefit with intervention low compared with significant potential harm, thus no intervention at this time.    -Treat as ACS: ASA,  plavix/heparin  -TTE with color flow  -cooling per primary team  -admit to ICU.      Thank you for this consult.    Signed:  Chin Hunter M.D.  Cardiology Fellow  Pager: [phone]  6/10/2018 4:35 AM    Attending Addendum:

## 2018-06-10 NOTE — ED TRIAGE NOTES
83 year old male presents to the ED via EMS with CPR in progress. Witnessed arrest at home. Received Epi and defibrillated in route. Upon arrival patient on Arden Device with Left Tibial IO in place.

## 2018-06-10 NOTE — PROGRESS NOTES
Pt taken to CT scan per orders, taken via bed w/portable monitor and gtts. RT present to manage vent. Zoll cath remains on standby- pt is self-cooling. Machine disconnected for transport. Family updated on POC.

## 2018-06-10 NOTE — ASSESSMENT & PLAN NOTE
- Found in his CBC on presentation; however, no concern for infectious etiology   - Will hold off on antibiotics for now, will address if there is decompensation

## 2018-06-10 NOTE — H&P
[facility]  Critical Care Medicine  History & Physical    Patient Name: Darren Villanueva  MRN: [id number]  Admission Date: 6/10/2018  Hospital Length of Stay: 0 days  Code Status: Full Code  Attending Physician: Lopez Hewitt MD   Primary Care Provider: Valentin Adams MD   Principal Problem: Cardiac arrest with ventricular fibrillation    Subjective:     HPI:  This is Mr. Darren Villanueva, 83 year old male with PMHx significant for extensive cardiac history including CAD involving autologous artery coronary bypass graft with previous Triple bypass surgery, Pacemaker, dyslipidemia and diabetes mellitus. He was brought in to [facility] ED by EMS after found to have cardiac arrest occurred at home at 0300, when the family emergency called EMS, and started CPR in Ambulance and transferred him to [facility] ED, rhythm was V Fibrillation and received shock and eventually achieved ROCS, intubated and had an ECG that showed diffuse ST depression. Cardiology consulted given his underlying etiology of cardiac arrest could be ischemic in etiology, however, decision was made to proceed MICU admission and consult cardiology for possible intervention. Of note, per son and niece, Mr. Darren Villanueva was having symptoms of chest pain and anginal pain on physical exertion, and estimated NYHA for him ~ class II. His cardiac care mostly in [facility], where he was following after his CABG ~ 7 years ago, and recently had dual chamber pacemaker.       Past Medical History:   Diagnosis Date    Arrhythmia, atrial     Hyperlipidemia     Hypertension     Renal failure, unspecified        Past Surgical History:   Procedure Laterality Date    Aortocoronary Bypass         Review of patient's allergies indicates:  No Known Allergies    Family History     None        Social History Main Topics    Smoking status: Never Smoker    Smokeless tobacco: Not on file    Alcohol use No    Drug use: No    Sexual activity: Not on file       Review of Systems   Unable to perform ROS: Intubated     Objective:     Vital Signs (Most Recent):  Temp: 97.5 °F (36.4 °C) (06/10/18 0335)  Pulse: 75 (06/10/18 0438)  Resp: 20 (06/10/18 0438)  BP: (!) 103/58 (06/10/18 0438)  SpO2: (!) 94 % (06/10/18 0438) Vital Signs (24h Range):  Temp:  [97.5 °F (36.4 °C)] 97.5 °F (36.4 °C)  Pulse:  [41-86] 75  Resp:  [17-24] 20  SpO2:  [94 %-100 %] 94 %  BP: (103-170)/(58-87) 103/58   Weight: 95.3 kg (210 lb)  Body mass index is 30.13 kg/m².    No intake or output data in the 24 hours ending 06/10/18 0448    Physical Exam   Constitutional:   Mechanical ventilation    HENT:   Head: Normocephalic and atraumatic.   Mouth/Throat: No oropharyngeal exudate.   Eyes: Pupils are equal, round, and reactive to light. Right eye exhibits no discharge. Left eye exhibits no discharge.   Neck: Normal range of motion. No JVD present. No thyromegaly present.   Cardiovascular: Normal rate and regular rhythm.  Exam reveals no friction rub.    Murmur heard.  V paced rhythm    Pulmonary/Chest: Effort normal. No respiratory distress. He has wheezes. He has rales.   Abdominal: Soft. Bowel sounds are normal. He exhibits no distension. There is no tenderness.   Musculoskeletal: Normal range of motion. He exhibits edema. He exhibits no deformity.   Neurological:   Sedated (fentanyl)        Vents:  Vent Mode: A/C (06/10/18 0415)  Ventilator Initiated: Yes (06/10/18 0249)  Set Rate: 20 bmp (06/10/18 0415)  Vt Set: 450 mL (06/10/18 0415)  Pressure Support: 0 cmH20 (06/10/18 0415)  PEEP/CPAP: 5 cmH20 (06/10/18 0415)  Oxygen Concentration (%): 100 (06/10/18 0415)  Peak Airway Pressure: 15 cmH2O (06/10/18 0415)  Plateau Pressure: 0 cmH20 (06/10/18 0415)  Total Ve: 5.53 mL (06/10/18 0415)  F/VT Ratio<105 (RSBI): (!) 49.26 (06/10/18 0415)  Lines/Drains/Airways     Drain                 NG/OG Tube 06/10/18 0429 orogastric 18 Fr. Center mouth less than 1 day         Urethral Catheter 06/10/18 0255 Temperature  probe less than 1 day          Airway                 Airway - Non-Surgical 06/10/18 0415 Endotracheal Tube less than 1 day          Intraosseous Line                 Intraosseous Line 06/10/18 0210 Tibia less than 1 day          Peripheral Intravenous Line                 Peripheral IV - Single Lumen 06/10/18 0215 Right Upper Arm less than 1 day         Peripheral IV - Single Lumen 06/10/18 0243 Left Upper Arm less than 1 day              Significant Labs:    CBC/Anemia Profile:    Recent Labs  Lab 06/10/18  0232   WBC 20.86*   HGB 11.2*   HCT 38.2*      *   RDW 13.4        Chemistries:    Recent Labs  Lab 06/10/18  0232   *   K 4.1      CO2 14*   BUN 39*   CREATININE 3.2*   CALCIUM 10.1   ALBUMIN 3.1*   PROT 7.1   BILITOT 0.3   ALKPHOS 134   ALT 92*   AST 73*   MG 2.1   PHOS 8.2*       BMP:   Recent Labs  Lab 06/10/18  0232   *   *   K 4.1      CO2 14*   BUN 39*   CREATININE 3.2*   CALCIUM 10.1   MG 2.1     CMP:   Recent Labs  Lab 06/10/18  0232   *   K 4.1      CO2 14*   *   BUN 39*   CREATININE 3.2*   CALCIUM 10.1   PROT 7.1   ALBUMIN 3.1*   BILITOT 0.3   ALKPHOS 134   AST 73*   ALT 92*   ANIONGAP 28*   EGFRNONAA 17.0*     Cardiac Markers: No results for input(s): CKMB, TROPONINT, MYOGLOBIN in the last 48 hours.  Coagulation:   Recent Labs  Lab 06/10/18  0232   INR 1.1   APTT <21.0     Lactic Acid:   Recent Labs  Lab 06/10/18  0232   LACTATE >12.0*       Significant Imaging: I have reviewed all pertinent imaging results/findings within the past 24 hours.  I have reviewed and interpreted all pertinent imaging results/findings within the past 24 hours.    Assessment/Plan:     Pulmonary   On mechanically assisted ventilation    - Intubated during the code, currently on   Vent Mode: A/C  Oxygen Concentration (%):  [] 100  Resp Rate Total:  [18 br/min-26 br/min] 20 br/min  Vt Set:  [450 mL-500 mL] 450 mL  PEEP/CPAP:  [5 cmH20] 5 cmH20  Pressure  Support:  [0 cmH20] 0 cmH20  Mean Airway Pressure:  [3.4 pkS76-86 cmH20] 11 cmH20        Cardiac/Vascular   * Cardiac arrest with ventricular fibrillation    - Etiology is most likely Nonprimary VF related to ischemic episode as his symptoms preceding the code and ECG finding of diffuse ST depression   - Cardiology consulted, and will treat him medically with DAPT, Heparin and consult interventional cardiology for intervention (coronary angiography).   - Will proceed with Therapeutic Hypothermia, ZOLL placed and targeted tempeture 36 c      Hyperlipemia    - He is taking Statin at home   - Will resume upon stabilizing current problem         HTN (hypertension)    - Taking Hydralazine, Coreg and Amlodipine   - Will hold off on resume his medication for now        Chronic systolic heart failure    - Ischemic in nature given his history of CAD  - Bedside echo showed EF ~ 30%, will repeat 2D Echo with doppler         Acute coronary syndrome    - Significant history of Coronary artery disease and CABG ~ 7 years ago, and ischemic HFrEF   - TRINY score is 7 points 41% risk at 14 days of: all-cause mortality  - Will proceed with medical management with DAPT and Heparin and cardiology consult         Coronary artery disease involving autologous artery coronary bypass graft    - Please see Acute coronary syndrome and principal problem for more elaboration.         Oncology   Leukocytosis    - Found in his CBC on presentation; however, no concern for infectious etiology   - Will hold off on antibiotics for now, will address if there is decompensation         Endocrine   Type II diabetes mellitus    - He is on Insulin glargine for his diabetes   - Will hold on medication in current setting and will continue to manage his diabetes per target for ICU patient 140-180             Critical Care Daily Checklist:    A: Awake: RASS Goal/Actual Goal:    Actual:     B: Spontaneous Breathing Trial Performed?     C: SAT & SBT Coordinated?   No                      D: Delirium: CAM-ICU     E: Early Mobility Performed? No   F: Feeding Goal:    Status:     Current Diet Order   Procedures    Diet NPO      AS: Analgesia/Sedation Yes Fentanyl    T: Thromboembolic Prophylaxis Yes    H: HOB > 300 Yes   U: Stress Ulcer Prophylaxis (if needed) Yes   G: Glucose Control Yes   B: Bowel Function     I: Indwelling Catheter (Lines & [place]) Necessity Yes   D: De-escalation of Antimicrobials/Pharmacotherapies No    Plan for the day/ETD No    Code Status:  Family/Goals of Care: Full Code  Ongoing        Critical secondary to Patient has a condition that poses threat to life and bodily function: Cardiac Arrest      Critical care was time spent personally by me on the following activities: development of treatment plan with patient or surrogate and bedside caregivers, discussions with consultants, evaluation of patient's response to treatment, examination of patient, ordering and performing treatments and interventions, ordering and review of laboratory studies, ordering and review of radiographic studies, pulse oximetry, re-evaluation of patient's condition. This critical care time did not overlap with that of any other provider or involve time for any procedures.     King Almonte MD  Critical Care Medicine  [facility]

## 2018-06-11 NOTE — PROGRESS NOTES
Orders placed to give pt 80 mg lasix IVP x 1. Spoke with Dr. Bowser after admin regarding pts labs, gave VO to give additional 40 mg lasix IVP x 1.

## 2018-06-11 NOTE — PROCEDURES
EXTENDED  ELECTROENCEPHALOGRAM  REPORT    Darren Villanueva  [id number]  9/21/1934    DATE OF SERVICE: 6/11/18    DATE OF ADMISSION: 6/10/2018  2:19 AM    ADMITTING/REQUESTING PROVIDER:  Stephanie Boudreaux MD    REASON FOR CONSULT: 84yo F with complex cardiac hx, admitted s/p cardiac arrest.    MEDICATIONS:   Current Facility-Administered Medications   Medication    acetaminophen oral solution 650 mg    amiodarone 360 mg/200 mL (1.8 mg/mL) infusion    [START ON 6/12/2018] aspirin chewable tablet 81 mg    atorvastatin tablet 40 mg    atropine injection    calcium chloride 100 mg/mL (10 %) injection    cefTRIAXone injection 1 g    chlorhexidine 0.12 % solution 15 mL    clopidogrel tablet 75 mg    dextrose 50% injection 12.5 g    famotidine (PF) injection 20 mg    fentaNYL 2500 mcg in 0.9% sodium chloride 250 mL infusion premix (titrating)    glucagon (human recombinant) injection 1 mg    heparin 25,000 units in dextrose 5% 250 mL (100 units/mL) infusion    insulin aspart U-100 pen 0-5 Units    insulin detemir U-100 pen 10 Units    propofol (DIPRIVAN) 10 mg/mL infusion    sodium bicarbonate 8.4 % (1 mEq/mL) injection    sodium chloride 0.9% flush 3 mL     METHODOLOGY   Electroencephalographic (EEG) recording is with electrodes placed according to the International 10-20 placement system.  Thirty two (32) channels of digital signal (sampling rate of 512/sec) including T1 and T2 was simultaneously recorded from the scalp and may include  EKG, EMG, and/or eye monitors.  Recording band pass was 0.1 to 512 hz.  Digital video recording of the patient is simultaneously recorded with the EEG.  The patient is instructed report clinical symptoms which may occur during the recording session.  EEG and video recording is stored and archived in digital format.  Activation procedures which include photic stimulation, hyperventilation and instructing patients to perform simple task are done in selected  patients.   The EEG is displayed on a monitor screen and can be reviewed using different montages.  Computer assisted analysis is employed to detect spike and electrographic seizure activity.   The entire record is submitted for computer analysis.  The entire recording is visually reviewed and the times identified by computer analysis as being spikes or seizures are reviewed again.  Compresses spectral analysis (CSA) is also performed on the activity recorded from each individual channel.  This is displayed as a power display of frequencies from 0 to 30 Hz over time.   The CSA is reviewed looking for asymmetries in power between homologous areas of the scalp and then compared with the original EEG recording.     [employer] software was also utilized in the review of this study.  This software suite analyzes the EEG recording in multiple domains.  Coherence and rhythmicity is computed to identify EEG sections which may contain organized seizures.  Each channel undergoes analysis to detect presence of spike and sharp waves which have special and morphological characteristic of epileptic activity.  The routine EEG recording is converted from spacial into frequency domain.  This is then displayed comparing homologous areas to identify areas of significant asymmetry.  Algorithm to identify non-cortically generated artifact is used to separate eye movement, EMG and other artifact from the EEG.      RECORDING TIMES  Start on 6/10/18, 12:16  Stop on 6/10/18, 13:20    A total of 1 hour and 4 minutes of EEG recording was obtained.    EEG FINGINGS  Background activity:   The background rhythm was characterized by delta (3-4 Hz) activity   No posterior dominant rhythm seen.   Symmetry and continuity: the background was continuous and symmetric  Bilateral EMG is noted intermittently, obscuring the study    Sleep:  Not seen.    Activation procedures:   Hyperventilation and photic stimulation were not performed   No response to  noxious stimuli    Abnormal activity:   Rare low amplitude generalized broad sharp waves seen  No periodic discharges, lateralized rhythmic delta activity or electrographic seizures were seen.    IMPRESSION:   This is an abnormal extended EEG due to:  1) rare generalized epileptiform discharges seen, suggestive of underlying epileptiform potential  2) severe generalized slowing seen, suggestive of severe diffuse or multifocal cerebral dysfunction.    No electrographic seizures seen.    CLINICAL CORRELATION IS RECOMMENDED    China Dey MD, SIMA(), FACNS, ALICIA.  Neurology-Epilepsy.  [facility].

## 2018-06-11 NOTE — PLAN OF CARE
Problem: Patient Care Overview  Goal: Plan of Care Review  Outcome: Ongoing (interventions implemented as appropriate)  POC reviewed with pt at the bedside. Pt remains on pressure control/vent settings R 20/PEEP 8/FiO2 50%. Sats 100%. Pt sedated at RAAS -4, flexion to pain, orders put in for EEG monitoring (no available monitor at the time), will place pt on EEG during day shift. Pupils remains sluggish. BIS monitor applied, artifact on monitor noted. BP remains stable 80-90s SBP, MAP >65. Plan is to rewarm pt at 1330 today. Maintaining targeted temp throughout shift at 36'C. Pt 100% paced, HR 60s. Anti Xa therpaeutic this morning. Pt remains on amio/fentanyl/propofol/heparin gtt. Anuric throughout the night despite lasix challenge. Cr steadily increasing. Per CCS, will discuss with family today regarding HD cath placement and the start of CRRT. Mg replaced per PRN orders. BG WNL. See flowsheet for full assessment. VSS. WCTM

## 2018-06-11 NOTE — ASSESSMENT & PLAN NOTE
- Taking Hydralazine, Coreg and Amlodipine at home  - Will hold off on resume his medication for now

## 2018-06-11 NOTE — SUBJECTIVE & OBJECTIVE
Interval History: Patient undergoing targeted temp management; has corneal and gag reflexes, sluggish pupils, no response to pain. Had oozing from zoll catheter overnight, improved with silver nitrate and epi. Urine output decreased overnight despite lasix. Nursing reports 75 cc put out this AM.     Past Medical History:   Diagnosis Date    Arrhythmia, atrial     Hyperlipidemia     Hypertension     Renal failure, unspecified        Past Surgical History:   Procedure Laterality Date    Aortocoronary Bypass         Review of patient's allergies indicates:  No Known Allergies    Family History     None        Social History Main Topics    Smoking status: Never Smoker    Smokeless tobacco: Not on file    Alcohol use No    Drug use: No    Sexual activity: Not on file      Review of Systems   Unable to perform ROS: Intubated     Objective:     Vital Signs (Most Recent):  Temp: 96.4 °F (35.8 °C) (06/11/18 0705)  Pulse: 60 (06/11/18 0705)  Resp: 20 (06/10/18 1333)  BP: (!) 94/50 (06/11/18 0705)  SpO2: 100 % (06/11/18 0705) Vital Signs (24h Range):  Temp:  [94.1 °F (34.5 °C)-97 °F (36.1 °C)] 96.4 °F (35.8 °C)  Pulse:  [59-72] 60  Resp:  [3-23] 20  SpO2:  [90 %-100 %] 100 %  BP: ()/(50-83) 94/50   Weight: 89.7 kg (197 lb 12 oz)  Body mass index is 26.82 kg/m².      Intake/Output Summary (Last 24 hours) at 06/11/18 0755  Last data filed at 06/11/18 0700   Gross per 24 hour   Intake          1893.86 ml   Output              882 ml   Net          1011.86 ml       Physical Exam   Constitutional:   Mechanical ventilation    HENT:   Head: Normocephalic and atraumatic.   Mouth/Throat: No oropharyngeal exudate.   Eyes: Pupils are equal, round, and reactive to light. Right eye exhibits no discharge. Left eye exhibits no discharge.   Neck: Normal range of motion. No JVD present. No thyromegaly present.   Cardiovascular: Normal rate and regular rhythm.  Exam reveals no friction rub.    Murmur heard.  V paced rhythm     Pulmonary/Chest: Effort normal. No respiratory distress. He has wheezes. He has rales.   Abdominal: Soft. Bowel sounds are normal. He exhibits no distension. There is no tenderness.   Musculoskeletal: Normal range of motion. He exhibits edema. He exhibits no deformity.   Neurological:   Sedated (fentanyl)        Vents:  Vent Mode: A/C (06/11/18 0703)  Ventilator Initiated: Yes (06/10/18 0249)  Set Rate: 20 bmp (06/11/18 0703)  Vt Set: 450 mL (06/10/18 1718)  Pressure Support: 0 cmH20 (06/10/18 0713)  PEEP/CPAP: 8 cmH20 (06/11/18 0703)  Oxygen Concentration (%): 51 (06/11/18 0705)  Peak Airway Pressure: 28 cmH2O (06/11/18 0703)  Plateau Pressure: 0 cmH20 (06/11/18 0703)  Total Ve: 9.86 mL (06/11/18 0703)  F/VT Ratio<105 (RSBI): (!) 6.59 (06/10/18 1055)  Lines/Drains/Airways     Central Venous Catheter Line                 Percutaneous Central Line Insertion/Assessment - quad lumen  06/10/18 0629 right femoral 1 day          Drain                 NG/OG Tube 06/10/18 0429 orogastric 18 Fr. Center mouth 1 day         Urethral Catheter 06/10/18 0255 Temperature probe 1 day          Airway                 Airway - Non-Surgical 06/10/18 0415 Endotracheal Tube 1 day          Peripheral Intravenous Line                 Peripheral IV - Single Lumen 06/10/18 0215 Right Upper Arm 1 day         Peripheral IV - Single Lumen 06/10/18 0243 Left Upper Arm 1 day              Significant Labs:    CBC/Anemia Profile:    Recent Labs  Lab 06/10/18  0232 06/10/18  0432 06/11/18  0229   WBC 20.86* 29.64* 21.61*   HGB 11.2* 10.4* 8.8*   HCT 38.2* 32.6* 27.3*    207 178   * 98 95   RDW 13.4 13.6 13.6        Chemistries:    Recent Labs  Lab 06/10/18  0232 06/10/18  0432 06/10/18  0446  06/10/18  2056 06/10/18  2330 06/11/18  0229   * 142 141  < > 143 144 143  143   K 4.1 5.8* 5.5*  < > 5.2* 5.0 4.4  4.4    105 104  < > 104 104 104  104   CO2 14* 23 23  < > 24 24 24  24   BUN 39* 41* 42*  < > 61* 61* 65*  65*    CREATININE 3.2* 3.2* 3.2*  < > 4.1* 4.4* 4.5*  4.5*   CALCIUM 10.1 9.3 9.4  < > 9.0 9.1 8.8  8.8   ALBUMIN 3.1* 2.9* 2.9*  --   --   --  2.8*   PROT 7.1 6.5 6.5  --   --   --  6.1   BILITOT 0.3 0.4 0.4  --   --   --  0.4   ALKPHOS 134 158* 157*  --   --   --  91   ALT 92* 391* 390*  --   --   --  259*   AST 73* 358* 369*  --   --   --  250*   MG 2.1 2.0  --   --   --   --  1.7   PHOS 8.2* 6.5*  --   < > 5.6* 5.0* 3.7   < > = values in this interval not displayed.    BMP:     Recent Labs  Lab 06/11/18 0229   *  116*  116*     143   K 4.4  4.4     104   CO2 24  24   BUN 65*  65*   CREATININE 4.5*  4.5*   CALCIUM 8.8  8.8   MG 1.7     CMP:   Recent Labs  Lab 06/10/18  0432 06/10/18  0446  06/10/18  2056 06/10/18  2330 06/11/18 0229    141  < > 143 144 143  143   K 5.8* 5.5*  < > 5.2* 5.0 4.4  4.4    104  < > 104 104 104  104   CO2 23 23  < > 24 24 24  24   * 315*  315*  315*  < > 209*  209* 142*  142* 116*  116*  116*   BUN 41* 42*  < > 61* 61* 65*  65*   CREATININE 3.2* 3.2*  < > 4.1* 4.4* 4.5*  4.5*   CALCIUM 9.3 9.4  < > 9.0 9.1 8.8  8.8   PROT 6.5 6.5  --   --   --  6.1   ALBUMIN 2.9* 2.9*  --   --   --  2.8*   BILITOT 0.4 0.4  --   --   --  0.4   ALKPHOS 158* 157*  --   --   --  91   * 369*  --   --   --  250*   * 390*  --   --   --  259*   ANIONGAP 14 14  < > 15 16 15  15   EGFRNONAA 17.0* 17.0*  < > 12.6* 11.6* 11.2*  11.2*   < > = values in this interval not displayed.  Cardiac Markers: No results for input(s): CKMB, TROPONINT, MYOGLOBIN in the last 48 hours.  Coagulation:     Recent Labs  Lab 06/11/18 0229   INR 1.1   APTT 54.4*     Lactic Acid:     Recent Labs  Lab 06/10/18  2056 06/10/18  2330 06/11/18 0229   LACTATE 2.6* 2.5* 2.3*       Significant Imaging: I have reviewed all pertinent imaging results/findings within the past 24 hours.  I have reviewed and interpreted all pertinent imaging results/findings within the past  24 hours.

## 2018-06-11 NOTE — PROGRESS NOTES
Rewarming process began at this time. Current temp is 36.0C with goal temp of 37.0C increasing at 0.2degreesCelcius/hour.

## 2018-06-11 NOTE — PROGRESS NOTES
CCS notified of pts K of 5.2 and Cr now 4.1. Also UO 2cc since beginning of shift. New orders to be put in. WCTM

## 2018-06-11 NOTE — ASSESSMENT & PLAN NOTE
- Etiology is most likely Nonprimary VF related to ischemic episode as his symptoms preceding the code and ECG finding of diffuse ST depression   - Cardiology consulted, and will treat him medically with DAPT, Heparin; no further intervention recommended by cards due to patient's high cardiac arrest hospital prognostication score  - Undergoing Therapeutic Hypothermia, will start rewarming today

## 2018-06-11 NOTE — HOSPITAL COURSE
Admitted to MICU 6/10 after out of hospital cardiac arrest 2/2 v.liban, with EKG showing ischemic changes. Cardiology assessed patient and recommended medical management only with DAPT and heparin ggt. No further intervention recommended by cards 2/2 high cardiac arrest prognostication score. Patient underwent targeted temperature management. Rewarming started . EEG completed showed diffuse generalized slowing likely representing severe cerebral dysfunction. Today  patient is 72 hrs post cardiac arrest without improvement in neurological exam off sedation. Family would like to withdraw care today. Patient started on morphine ggt and PRN ativan for comfort and extubated. Patient  shortly thereafter.

## 2018-06-11 NOTE — PLAN OF CARE
Problem: Patient Care Overview  Goal: Plan of Care Review  Outcome: Ongoing (interventions implemented as appropriate)  No acute events throughout day. See vital signs and assessments in flowsheets. See below for updates on today's progress.     Pulmonary: remains intubated, changes made to vent per primary team, O2 sats >95%    Cardiovascular: 100% paced rhythm with HR at 60, BP WNL with MAPs >65    Neurological: Eyes open with movement - does not track or follow commands, pupils are equal and sluggish, but reactive. Cough and gag remain intact. Corneal reflex absent. Does not withdraw from painful stimuli.     Gastrointestinal: OGT to LIWS with minimal output. No BM     Genitourinary: casillas remains draining yellow urine to gravity at 0-25 ml/hr    Endocrine: accuchecks Q6H WNL    Integumentary/Other: Warming process started at 1330 - goal temp is 37 C. No complications thus far.     Infusions: Heparin, Prop, Fent, Amio    Patient progressing towards goals as tolerated, plan of care communicated and reviewed with Darren Villanueva. All concerns addressed. Will continue to monitor.

## 2018-06-11 NOTE — ASSESSMENT & PLAN NOTE
- Intubated during the code, currently on   Vent Mode: A/C  Oxygen Concentration (%):  [50-71] 51  Resp Rate Total:  [20 br/min-28 br/min] 20 br/min  Vt Set:  [450 mL] 450 mL  PEEP/CPAP:  [8 cmH20] 8 cmH20  Mean Airway Pressure:  [14 unK01-25 cmH20] 14 cmH20

## 2018-06-11 NOTE — PLAN OF CARE
Pt. admitted after having cardiac arrest and vent. Fib. Lives with spouse. Currently intubated on vent. TERRY at bedside providing information for assessment. Pt.not stable requiring ICU care. D/C needs to be determined.    Payor: [employer] MANAGED MEDICARE / Plan: [employer] MEDICARE HMO / Product Type: Capitation /      Valentin Adams MD     Future Appointments  Date Time Provider Department Center   6/11/2018 2:00 PM NEURODIAGNOSTICS, APPT [facility]         [id number] [address]  [address] [facility] [address]  Phone: [phone] Fax: [phone]      Extended Emergency Contact Information  Primary Emergency Contact: JohnNoemy   [facility]  Home Phone: [phone]  Relation: Daughter  Secondary Emergency Contact: EliseoLuiza bauer  Address: [address] [place] [address] [facility]  Home Phone: [phone]  Relation: Significant other        06/11/18 1014   Discharge Assessment   Assessment Type Discharge Planning Assessment   Confirmed/corrected address and phone number on facesheet? Yes   Assessment information obtained from? Caregiver  (Dominick STEWART at bedside. Pt. intubated and sedated)   Expected Length of Stay (days) 5   Communicated expected length of stay with patient/caregiver no   Prior to hospitilization cognitive status: Alert/Oriented   Prior to hospitalization functional status: Independent   Current cognitive status: Coma/Sedated/Intubated   Current Functional Status: Completely Dependent   Facility Arrived From: home   Lives With spouse   Able to Return to Prior Arrangements yes   Is patient able to care for self after discharge? Yes  (with assistance)   Who are your caregiver(s) and their phone number(s)? Spouse Radha [phone]   Patient's perception of discharge disposition home or selfcare   Readmission Within The Last 30 Days no previous admission in last 30 days   Patient  currently being followed by outpatient case management? No   Patient currently receives any other outside agency services? No   Equipment Currently Used at Home none   Do you have any problems affording any of your prescribed medications? No   Is the patient taking medications as prescribed? yes   Does the patient have transportation home? Yes   Transportation Available family or friend will provide   Does the patient receive services at the [facility]? No   Discharge Plan A Skilled Nursing Facility  (SNF vs )   Discharge Plan B Home;Home Health   Patient/Family In Agreement With Plan yes

## 2018-06-11 NOTE — PROGRESS NOTES
Dr. Almonte at the bedside to assess bleeding/oozing from zoll cath/central line site to the R groin. Surgicel/gel foam/pressure attempted to stop bleeding with no success. Will try silver nitrate per VO from Dr. Almonte.    Also notified CCS of pts increasing Cr (now 4.5) as pts remains anuric. Discussion of possible HD cath being placed gladys.

## 2018-06-11 NOTE — PROGRESS NOTES
[facility]  Critical Care Medicine  Progress Note    Patient Name: Darren Villanueva  MRN: [id number]  Admission Date: 6/10/2018  Hospital Length of Stay: 1 days  Code Status: DNR  Attending Provider: Vidal Cerrato*  Primary Care Provider: Valentin Adams MD   Principal Problem: Cardiac arrest with ventricular fibrillation    Subjective:     HPI:  This is Mr. Darren Villanueva, 83 year old male with PMHx significant for extensive cardiac history including CAD involving autologous artery coronary bypass graft with previous Triple bypass surgery, Pacemaker, dyslipidemia and diabetes mellitus. He was brought in to [facility] ED by EMS after found to have cardiac arrest occurred at home at 0300, when the family emergency called EMS, and started CPR in Ambulance and transferred him to [facility] ED, rhythm was V Fibrillation and received shock and eventually achieved ROCS, intubated and had an ECG that showed diffuse ST depression. Cardiology consulted given his underlying etiology of cardiac arrest could be ischemic in etiology, however, decision was made to proceed MICU admission and consult cardiology for possible intervention. Of note, per son and niece, Mr. Darren Villanueva was having symptoms of chest pain and anginal pain on physical exertion, and estimated NYHA for him ~ class II. His cardiac care mostly in [facility], where he was following after his CABG ~ 7 years ago, and recently had dual chamber pacemaker.     Hospital/ICU Course:  Admitted to MICU 6/10 after out of hospital cardiac arrest 2/2 v.fib x 2, with EKG showing ischemic changes. Cardiology following patient and recommending medical management only with DAPT and heparin ggt. No further intervention recommended by cards 2/2 high cardiac arrest prognostication score. Patient undergoing targeted temperature management.     Interval History: Patient undergoing targeted temp management; has corneal and gag reflexes, sluggish pupils, no  response to pain. Had oozing from zoll catheter overnight, improved with silver nitrate and epi. Urine output decreased overnight despite lasix. Nursing reports 75 cc put out this AM.     Past Medical History:   Diagnosis Date    Arrhythmia, atrial     Hyperlipidemia     Hypertension     Renal failure, unspecified        Past Surgical History:   Procedure Laterality Date    Aortocoronary Bypass         Review of patient's allergies indicates:  No Known Allergies    Family History     None        Social History Main Topics    Smoking status: Never Smoker    Smokeless tobacco: Not on file    Alcohol use No    Drug use: No    Sexual activity: Not on file      Review of Systems   Unable to perform ROS: Intubated     Objective:     Vital Signs (Most Recent):  Temp: 96.4 °F (35.8 °C) (06/11/18 0705)  Pulse: 60 (06/11/18 0705)  Resp: 20 (06/10/18 1333)  BP: (!) 94/50 (06/11/18 0705)  SpO2: 100 % (06/11/18 0705) Vital Signs (24h Range):  Temp:  [94.1 °F (34.5 °C)-97 °F (36.1 °C)] 96.4 °F (35.8 °C)  Pulse:  [59-72] 60  Resp:  [3-23] 20  SpO2:  [90 %-100 %] 100 %  BP: ()/(50-83) 94/50   Weight: 89.7 kg (197 lb 12 oz)  Body mass index is 26.82 kg/m².      Intake/Output Summary (Last 24 hours) at 06/11/18 0755  Last data filed at 06/11/18 0700   Gross per 24 hour   Intake          1893.86 ml   Output              882 ml   Net          1011.86 ml       Physical Exam   Constitutional:   Mechanical ventilation    HENT:   Head: Normocephalic and atraumatic.   Mouth/Throat: No oropharyngeal exudate.   Eyes: Pupils are equal, round, and reactive to light. Right eye exhibits no discharge. Left eye exhibits no discharge.   Neck: Normal range of motion. No JVD present. No thyromegaly present.   Cardiovascular: Normal rate and regular rhythm.  Exam reveals no friction rub.    Murmur heard.  V paced rhythm    Pulmonary/Chest: Effort normal. No respiratory distress. He has wheezes. He has rales.   Abdominal: Soft. Bowel  sounds are normal. He exhibits no distension. There is no tenderness.   Musculoskeletal: Normal range of motion. He exhibits edema. He exhibits no deformity.   Neurological:   Sedated (fentanyl)        Vents:  Vent Mode: A/C (06/11/18 0703)  Ventilator Initiated: Yes (06/10/18 0249)  Set Rate: 20 bmp (06/11/18 0703)  Vt Set: 450 mL (06/10/18 1718)  Pressure Support: 0 cmH20 (06/10/18 0713)  PEEP/CPAP: 8 cmH20 (06/11/18 0703)  Oxygen Concentration (%): 51 (06/11/18 0705)  Peak Airway Pressure: 28 cmH2O (06/11/18 0703)  Plateau Pressure: 0 cmH20 (06/11/18 0703)  Total Ve: 9.86 mL (06/11/18 0703)  F/VT Ratio<105 (RSBI): (!) 6.59 (06/10/18 1055)  Lines/Drains/Airways     Central Venous Catheter Line                 Percutaneous Central Line Insertion/Assessment - quad lumen  06/10/18 0629 right femoral 1 day          Drain                 NG/OG Tube 06/10/18 0429 orogastric 18 Fr. Center mouth 1 day         Urethral Catheter 06/10/18 0255 Temperature probe 1 day          Airway                 Airway - Non-Surgical 06/10/18 0415 Endotracheal Tube 1 day          Peripheral Intravenous Line                 Peripheral IV - Single Lumen 06/10/18 0215 Right Upper Arm 1 day         Peripheral IV - Single Lumen 06/10/18 0243 Left Upper Arm 1 day              Significant Labs:    CBC/Anemia Profile:    Recent Labs  Lab 06/10/18  0232 06/10/18  0432 06/11/18  0229   WBC 20.86* 29.64* 21.61*   HGB 11.2* 10.4* 8.8*   HCT 38.2* 32.6* 27.3*    207 178   * 98 95   RDW 13.4 13.6 13.6        Chemistries:    Recent Labs  Lab 06/10/18  0232 06/10/18  0432 06/10/18  0446  06/10/18  2056 06/10/18  2330 06/11/18  0229   * 142 141  < > 143 144 143  143   K 4.1 5.8* 5.5*  < > 5.2* 5.0 4.4  4.4    105 104  < > 104 104 104  104   CO2 14* 23 23  < > 24 24 24  24   BUN 39* 41* 42*  < > 61* 61* 65*  65*   CREATININE 3.2* 3.2* 3.2*  < > 4.1* 4.4* 4.5*  4.5*   CALCIUM 10.1 9.3 9.4  < > 9.0 9.1 8.8  8.8   ALBUMIN 3.1*  2.9* 2.9*  --   --   --  2.8*   PROT 7.1 6.5 6.5  --   --   --  6.1   BILITOT 0.3 0.4 0.4  --   --   --  0.4   ALKPHOS 134 158* 157*  --   --   --  91   ALT 92* 391* 390*  --   --   --  259*   AST 73* 358* 369*  --   --   --  250*   MG 2.1 2.0  --   --   --   --  1.7   PHOS 8.2* 6.5*  --   < > 5.6* 5.0* 3.7   < > = values in this interval not displayed.    BMP:     Recent Labs  Lab 06/11/18 0229   *  116*  116*     143   K 4.4  4.4     104   CO2 24  24   BUN 65*  65*   CREATININE 4.5*  4.5*   CALCIUM 8.8  8.8   MG 1.7     CMP:   Recent Labs  Lab 06/10/18  0432 06/10/18  0446  06/10/18  2056 06/10/18  2330 06/11/18  0229    141  < > 143 144 143  143   K 5.8* 5.5*  < > 5.2* 5.0 4.4  4.4    104  < > 104 104 104  104   CO2 23 23  < > 24 24 24  24   * 315*  315*  315*  < > 209*  209* 142*  142* 116*  116*  116*   BUN 41* 42*  < > 61* 61* 65*  65*   CREATININE 3.2* 3.2*  < > 4.1* 4.4* 4.5*  4.5*   CALCIUM 9.3 9.4  < > 9.0 9.1 8.8  8.8   PROT 6.5 6.5  --   --   --  6.1   ALBUMIN 2.9* 2.9*  --   --   --  2.8*   BILITOT 0.4 0.4  --   --   --  0.4   ALKPHOS 158* 157*  --   --   --  91   * 369*  --   --   --  250*   * 390*  --   --   --  259*   ANIONGAP 14 14  < > 15 16 15  15   EGFRNONAA 17.0* 17.0*  < > 12.6* 11.6* 11.2*  11.2*   < > = values in this interval not displayed.  Cardiac Markers: No results for input(s): CKMB, TROPONINT, MYOGLOBIN in the last 48 hours.  Coagulation:     Recent Labs  Lab 06/11/18 0229   INR 1.1   APTT 54.4*     Lactic Acid:     Recent Labs  Lab 06/10/18  2056 06/10/18  2330 06/11/18 0229   LACTATE 2.6* 2.5* 2.3*       Significant Imaging: I have reviewed all pertinent imaging results/findings within the past 24 hours.  I have reviewed and interpreted all pertinent imaging results/findings within the past 24 hours.    Assessment/Plan:     Pulmonary   On mechanically assisted ventilation    - Intubated during the code,  currently on   Vent Mode: A/C  Oxygen Concentration (%):  [50-71] 51  Resp Rate Total:  [20 br/min-28 br/min] 20 br/min  Vt Set:  [450 mL] 450 mL  PEEP/CPAP:  [8 cmH20] 8 cmH20  Mean Airway Pressure:  [14 [id number] cmH20] 14 cmH20        Cardiac/Vascular   * Cardiac arrest with ventricular fibrillation    - Etiology is most likely Nonprimary VF related to ischemic episode as his symptoms preceding the code and ECG finding of diffuse ST depression   - Cardiology consulted, and will treat him medically with DAPT, Heparin; no further intervention recommended by cards due to patient's high cardiac arrest hospital prognostication score  - Undergoing Therapeutic Hypothermia, will start rewarming today        Acute coronary syndrome    - Significant history of Coronary artery disease and CABG ~ 7 years ago, and ischemic HFrEF   - TRINY score is 7 points 41% risk at 14 days of: all-cause mortality  - Will proceed with medical management with DAPT and Heparin        Coronary artery disease involving autologous artery coronary bypass graft    - Please see Acute coronary syndrome and principal problem for more elaboration.         Chronic systolic heart failure    - Ischemic in nature given his history of CAD  - Bedside echo showed EF ~ 30%, repeat 2D Echo with doppler showed EF 35%        Hyperlipemia    - He is taking Statin at home  - On Lipitor 40        HTN (hypertension)    - Taking Hydralazine, Coreg and Amlodipine at home  - Will hold off on resume his medication for now        Oncology   Leukocytosis    - Found in his CBC on presentation; however, no concern for infectious etiology   - Will hold off on antibiotics for now, will address if there is decompensation         Endocrine   Type II diabetes mellitus    - He is on Insulin glargine for his diabetes   - Will hold on medication in current setting and will continue to manage his diabetes per target for ICU patient 140-180            Critical Care Daily Checklist:     A: Awake: RASS Goal/Actual Goal: RASS Goal: -4-->deep sedation  Actual: Kevin Agitation Sedation Scale (RASS): Moderate sedation   B: Spontaneous Breathing Trial Performed?  No   C: SAT & SBT Coordinated?  n/a                   D: Delirium: CAM-ICU Overall CAM-ICU: Positive   E: Early Mobility Performed? Yes   F: Feeding Goal: Goals: Meet % EEN, EPN  Status: Nutrition Goal Status: new   Current Diet Order   Procedures    Diet NPO      AS: Analgesia/Sedation Fentanyl and propofol    T: Thromboembolic Prophylaxis Heparin ggt   H: HOB > 300 Yes   U: Stress Ulcer Prophylaxis (if needed) famotidine   G: Glucose Control SSI   B: Bowel Function Stool Occurrence: 0   I: Indwelling Catheter (Lines & Casillas) Necessity R femoral zoll, periph IV x2, NG/OG, casillas, ET tube   D: De-escalation of Antimicrobials/Pharmacotherapies Rocephin UTI    Plan for the day/ETD rewarming    Code Status:  Family/Goals of Care: DNR         Critical secondary to Patient has a condition that poses threat to life and bodily function: cardiac arrest      Critical care was time spent personally by me on the following activities: development of treatment plan with patient or surrogate and bedside caregivers, discussions with consultants, evaluation of patient's response to treatment, examination of patient, ordering and performing treatments and interventions, ordering and review of laboratory studies, ordering and review of radiographic studies, pulse oximetry, re-evaluation of patient's condition. This critical care time did not overlap with that of any other provider or involve time for any procedures.     Shelia Benavides MD  Critical Care Medicine  [facility]

## 2018-06-11 NOTE — ASSESSMENT & PLAN NOTE
- Significant history of Coronary artery disease and CABG ~ 7 years ago, and ischemic HFrEF   - TRINY score is 7 points 41% risk at 14 days of: all-cause mortality  - Will proceed with medical management with DAPT and Heparin

## 2018-06-11 NOTE — ASSESSMENT & PLAN NOTE
- Ischemic in nature given his history of CAD  - Bedside echo showed EF ~ 30%, repeat 2D Echo with doppler showed EF 35%

## 2018-06-12 PROBLEM — J96.01 ACUTE HYPOXEMIC RESPIRATORY FAILURE: Status: ACTIVE | Noted: 2018-01-01

## 2018-06-12 NOTE — ASSESSMENT & PLAN NOTE
- He is on Insulin glargine for his diabetes at home  - Will hold on medication in current setting and will continue to manage his diabetes per target for ICU patient 140-180

## 2018-06-12 NOTE — SUBJECTIVE & OBJECTIVE
Interval History: Decreasing sedation with fentanyl, propofol. Undergoing targeted temp management, started rewarming yesterday. He has gag reflexes, sluggish pupils, withdraws to pain LLE. Urine output decreased with 20-30 cc per hour over past few hours.    Past Medical History:   Diagnosis Date    Arrhythmia, atrial     Hyperlipidemia     Hypertension     Renal failure, unspecified        Past Surgical History:   Procedure Laterality Date    Aortocoronary Bypass         Review of patient's allergies indicates:  No Known Allergies    Family History     None        Social History Main Topics    Smoking status: Never Smoker    Smokeless tobacco: Not on file    Alcohol use No    Drug use: No    Sexual activity: Not on file      Review of Systems   Unable to perform ROS: Intubated     Objective:     Vital Signs (Most Recent):  Temp: 98.1 °F (36.7 °C) (06/12/18 0701)  Pulse: 63 (06/12/18 0719)  Resp: 20 (06/12/18 0315)  BP: (!) 117/59 (06/12/18 0701)  SpO2: 98 % (06/12/18 0719) Vital Signs (24h Range):  Temp:  [95.7 °F (35.4 °C)-98.2 °F (36.8 °C)] 98.1 °F (36.7 °C)  Pulse:  [60-73] 63  Resp:  [20] 20  SpO2:  [94 %-100 %] 98 %  BP: ()/(50-73) 117/59   Weight: 89.7 kg (197 lb 12 oz)  Body mass index is 26.82 kg/m².      Intake/Output Summary (Last 24 hours) at 06/12/18 0738  Last data filed at 06/12/18 0700   Gross per 24 hour   Intake          1405.38 ml   Output              208 ml   Net          1197.38 ml       Physical Exam   Constitutional:   Mechanical ventilation    HENT:   Head: Normocephalic and atraumatic.   Mouth/Throat: No oropharyngeal exudate.   Eyes: Pupils are equal, round, and reactive to light. Right eye exhibits no discharge. Left eye exhibits no discharge.   Neck: Normal range of motion. No JVD present. No thyromegaly present.   Cardiovascular: Normal rate and regular rhythm.  Exam reveals no friction rub.    Murmur heard.  V paced rhythm    Pulmonary/Chest: Effort normal. No  respiratory distress. He has wheezes. He has rales.   Abdominal: Soft. Bowel sounds are normal. He exhibits no distension. There is no tenderness.   Musculoskeletal: Normal range of motion. He exhibits edema. He exhibits no deformity.   Neurological:   Sedated (fentanyl)        Vents:  Vent Mode: A/C (06/12/18 0719)  Ventilator Initiated: Yes (06/10/18 0249)  Set Rate: 14 bmp (06/12/18 0719)  Vt Set: 440 mL (06/12/18 0719)  Pressure Support: 0 cmH20 (06/10/18 0713)  PEEP/CPAP: 8 cmH20 (06/12/18 0719)  Oxygen Concentration (%): 51 (06/12/18 0719)  Peak Airway Pressure: 47 cmH2O (06/12/18 0719)  Plateau Pressure: 23 cmH20 (06/12/18 0719)  Total Ve: 7.42 mL (06/12/18 0719)  F/VT Ratio<105 (RSBI): (!) 6.59 (06/10/18 1055)  Lines/Drains/Airways     Central Venous Catheter Line                 Percutaneous Central Line Insertion/Assessment - quad lumen  06/10/18 0629 right femoral 2 days          Drain                 NG/OG Tube 06/10/18 0429 orogastric 18 Fr. Center mouth 2 days         Urethral Catheter 06/10/18 0255 Temperature probe 2 days          Airway                 Airway - Non-Surgical 06/10/18 0415 Endotracheal Tube 2 days          Peripheral Intravenous Line                 Peripheral IV - Single Lumen 06/10/18 0215 Right Upper Arm 2 days         Peripheral IV - Single Lumen 06/10/18 0243 Left Upper Arm 2 days              Significant Labs:    CBC/Anemia Profile:    Recent Labs  Lab 06/11/18  0229 06/12/18  0400   WBC 21.61* 27.09*   HGB 8.8* 8.7*   HCT 27.3* 27.4*    168   MCV 95 99*   RDW 13.6 14.0        Chemistries:    Recent Labs  Lab 06/11/18  0229 06/11/18  0819  06/11/18  1939 06/11/18  2337 06/12/18  0400     143 142  < > 141 141 140  140   K 4.4  4.4 4.0  < > 4.2 4.6 4.4  4.4     104 103  < > 101 101 100  100   CO2 24  24 25  < > 23 25 21*  21*   BUN 65*  65* 69*  < > 74* 74* 77*  77*   CREATININE 4.5*  4.5* 4.9*  < > 5.6* 5.8* 6.3*  6.3*   CALCIUM 8.8  8.8 8.7  < >  8.5* 8.7 8.5*  8.5*   ALBUMIN 2.8*  --   --   --   --  2.8*   PROT 6.1  --   --   --   --  6.4   BILITOT 0.4  --   --   --   --  0.4   ALKPHOS 91  --   --   --   --  96   *  --   --   --   --  172*   *  --   --   --   --  130*   MG 1.7 2.2  --   --   --  2.2   PHOS 3.7 4.2  < > 5.5* 6.0* 6.2*   < > = values in this interval not displayed.    BMP:     Recent Labs  Lab 06/12/18  0400   GLU 86  86  86     140   K 4.4  4.4     100   CO2 21*  21*   BUN 77*  77*   CREATININE 6.3*  6.3*   CALCIUM 8.5*  8.5*   MG 2.2     CMP:   Recent Labs  Lab 06/11/18  0229  06/11/18  1939 06/11/18  2337 06/12/18  0400     143  < > 141 141 140  140   K 4.4  4.4  < > 4.2 4.6 4.4  4.4     104  < > 101 101 100  100   CO2 24  24  < > 23 25 21*  21*   *  116*  116*  < > 95  95 93  93 86  86  86   BUN 65*  65*  < > 74* 74* 77*  77*   CREATININE 4.5*  4.5*  < > 5.6* 5.8* 6.3*  6.3*   CALCIUM 8.8  8.8  < > 8.5* 8.7 8.5*  8.5*   PROT 6.1  --   --   --  6.4   ALBUMIN 2.8*  --   --   --  2.8*   BILITOT 0.4  --   --   --  0.4   ALKPHOS 91  --   --   --  96   *  --   --   --  130*   *  --   --   --  172*   ANIONGAP 15  15  < > 17* 15 19*  19*   EGFRNONAA 11.2*  11.2*  < > 8.6* 8.3* 7.5*  7.5*   < > = values in this interval not displayed.  Cardiac Markers: No results for input(s): CKMB, TROPONINT, MYOGLOBIN in the last 48 hours.  Coagulation:     Recent Labs  Lab 06/12/18  0400   INR 1.0   APTT 35.1*     Lactic Acid:     Recent Labs  Lab 06/11/18  1938 06/11/18  2337 06/12/18  0400   LACTATE 0.7 0.8 0.8       Significant Imaging: I have reviewed all pertinent imaging results/findings within the past 24 hours.  I have reviewed and interpreted all pertinent imaging results/findings within the past 24 hours.

## 2018-06-12 NOTE — PLAN OF CARE
Problem: Patient Care Overview  Goal: Plan of Care Review  Outcome: Ongoing (interventions implemented as appropriate)  No acute events throughout day. See vital signs and assessments in flowsheets. See below for updates on today's progress.     Pulmonary: remains intubated. Vent setting dramatically increased after episode of desaturation with physicians at bedside. Fi02 now at 75% and PEEP at 8. O2 sats around 93-95%    Cardiovascular: remains 100% paced, BP WNL    Neurological: pupils reactive, but sluggish, cough and gag intact, no corneal reflex, only withdraws to pain in LLE.     Gastrointestinal: OGT to LIWS until scheduled PO meds given. No BM.    Genitourinary: casillas remains. UOP minimal at 0-20 ml/hr.    Endocrine: accuchecks WNL     Integumentary/Other: turned Q2H, protective foam dressings remain in place to heels and sacrum, Temp remains at 37C with zoll still in place.     Infusions: Propofol and Fentanyl    Patient progressing towards goals as tolerated, plan of care communicated and reviewed with Darren Villanueva and family. All concerns addressed. Will continue to monitor.

## 2018-06-12 NOTE — PROVIDER TRANSFER
Spoke with Denae at 12:30 PM and patient is ready for step down to hospital medicine liver transplant service from critical care medicine.    Leena Benavides MD  IM PGY-2

## 2018-06-12 NOTE — ASSESSMENT & PLAN NOTE
- Etiology is most likely Nonprimary VF related to ischemic episode as his symptoms preceding the code and ECG finding of diffuse ST depression   - Cardiology consulted, and will treat him medically with DAPT, Heparin; no further intervention recommended by cards due to patient's high cardiac arrest hospital prognostication score  - Undergoing Therapeutic Hypothermia, started rewarming yesterday 6/11  - neuro exam shows sluggish pupils, gag reflex present, questionable corneal reflexes, withdraws to pain LLE only  - EEG completed yesterday shows rate epileptiform discharges and severe generalized slowing suggestive of diffuse multifocal cerebral dysfunction

## 2018-06-12 NOTE — PLAN OF CARE
Problem: Patient Care Overview  Goal: Plan of Care Review  Outcome: Ongoing (interventions implemented as appropriate)  NAEON. VSS, afebrile, ventricular paced rhythm HR 60s, normotensive (110s-130s/50s-60s), no ectopies. On zoll, patient maintained normothermia overnight, bladder probe at 37C and esophageal at ~36.8C. No shivering noted. Scheduled tylenol given. ETT @ 27cm, on FiO2 50% and PEEP 8, coarse LS bilaterally, sats %, set rate of 14, breathing ~20bpm. OG on LIS, hypoactive BSx4. Oozing from right groin quad small. Small amount oozed out of dressing while turning for CHD bath, no more further bleeding noted. PERLL, GCS 4, does not withdraw, no corneal, no gag, intact cough, upwards gaze noted. JEN called. Minimal UOP (70cc/shift), CCS aware of pt's kidneys status. PIV, quad lumen. Propofol @ 15, fentanyl @ 50, heparin @ 10, amio @ 0.5. POC reviewed with family. All questions answered and emotional support provided. Will continue to monitor and provide support as needed.

## 2018-06-12 NOTE — PROGRESS NOTES
[facility]  Critical Care Medicine  Progress Note    Patient Name: Darren Villanueva  MRN: [id number]  Admission Date: 6/10/2018  Hospital Length of Stay: 2 days  Code Status: DNR  Attending Provider: Vidal Cerrato*  Primary Care Provider: Valentin Adams MD   Principal Problem: Cardiac arrest with ventricular fibrillation    Subjective:     HPI:  This is Mr. Darren Villanueva, 83 year old male with PMHx significant for extensive cardiac history including CAD involving autologous artery coronary bypass graft with previous Triple bypass surgery, Pacemaker, dyslipidemia and diabetes mellitus. He was brought [place] [facility] [place] after found to have cardiac arrest occurred at home at 0300, when the family emergency called EMS, and started CPR in Ambulance and transferred him to [facility], rhythm was V Fibrillation and received shock and eventually achieved ROCS, intubated and had an ECG that showed diffuse ST depression. Cardiology consulted given his underlying etiology of cardiac arrest could be ischemic in etiology, however, decision was made to proceed MICU admission and consult cardiology for possible intervention. Of note, per son and niece, Mr. Darren Villanueva was having symptoms of chest pain and anginal pain on physical exertion, and estimated NYHA for him ~ class II. His cardiac care mostly in [facility], where he was following after his CABG ~ 7 years ago, and recently had dual chamber pacemaker.     Hospital/ICU Course:  Admitted to MICU 6/10 after out of hospital cardiac arrest 2/2 v.fib, with EKG showing ischemic changes. Cardiology following patient and recommending medical management only with DAPT and heparin ggt. No further intervention recommended by cards 2/2 high cardiac arrest prognostication score. Patient undergoing targeted temperature management. Rewarming started 6/11.    Interval History: Decreasing sedation with fentanyl, propofol. Undergoing targeted temp management,  started rewarming yesterday. He has gag reflexes, sluggish pupils, withdraws to pain LLE. Urine output decreased with 20-30 cc per hour over past few hours.    Past Medical History:   Diagnosis Date    Arrhythmia, atrial     Hyperlipidemia     Hypertension     Renal failure, unspecified        Past Surgical History:   Procedure Laterality Date    Aortocoronary Bypass         Review of patient's allergies indicates:  No Known Allergies    Family History     None        Social History Main Topics    Smoking status: Never Smoker    Smokeless tobacco: Not on file    Alcohol use No    Drug use: No    Sexual activity: Not on file      Review of Systems   Unable to perform ROS: Intubated     Objective:     Vital Signs (Most Recent):  Temp: 98.1 °F (36.7 °C) (06/12/18 0701)  Pulse: 63 (06/12/18 0719)  Resp: 20 (06/12/18 0315)  BP: (!) 117/59 (06/12/18 0701)  SpO2: 98 % (06/12/18 0719) Vital Signs (24h Range):  Temp:  [95.7 °F (35.4 °C)-98.2 °F (36.8 °C)] 98.1 °F (36.7 °C)  Pulse:  [60-73] 63  Resp:  [20] 20  SpO2:  [94 %-100 %] 98 %  BP: ()/(50-73) 117/59   Weight: 89.7 kg (197 lb 12 oz)  Body mass index is 26.82 kg/m².      Intake/Output Summary (Last 24 hours) at 06/12/18 0738  Last data filed at 06/12/18 0700   Gross per 24 hour   Intake          1405.38 ml   Output              208 ml   Net          1197.38 ml       Physical Exam   Constitutional:   Mechanical ventilation    HENT:   Head: Normocephalic and atraumatic.   Mouth/Throat: No oropharyngeal exudate.   Eyes: Pupils are equal, round, and reactive to light. Right eye exhibits no discharge. Left eye exhibits no discharge.   Neck: Normal range of motion. No JVD present. No thyromegaly present.   Cardiovascular: Normal rate and regular rhythm.  Exam reveals no friction rub.    Murmur heard.  V paced rhythm    Pulmonary/Chest: Effort normal. No respiratory distress. He has wheezes. He has rales.   Abdominal: Soft. Bowel sounds are normal. He exhibits  no distension. There is no tenderness.   Musculoskeletal: Normal range of motion. He exhibits edema. He exhibits no deformity.   Neurological:   Sedated (fentanyl)        Vents:  Vent Mode: A/C (06/12/18 0719)  Ventilator Initiated: Yes (06/10/18 0249)  Set Rate: 14 bmp (06/12/18 0719)  Vt Set: 440 mL (06/12/18 0719)  Pressure Support: 0 cmH20 (06/10/18 0713)  PEEP/CPAP: 8 cmH20 (06/12/18 0719)  Oxygen Concentration (%): 51 (06/12/18 0719)  Peak Airway Pressure: 47 cmH2O (06/12/18 0719)  Plateau Pressure: 23 cmH20 (06/12/18 0719)  Total Ve: 7.42 mL (06/12/18 0719)  F/VT Ratio<105 (RSBI): (!) 6.59 (06/10/18 1055)  Lines/Drains/Airways     Central Venous Catheter Line                 Percutaneous Central Line Insertion/Assessment - quad lumen  06/10/18 0629 right femoral 2 days          Drain                 NG/OG Tube 06/10/18 0429 orogastric 18 Fr. Center mouth 2 days         Urethral Catheter 06/10/18 0255 Temperature probe 2 days          Airway                 Airway - Non-Surgical 06/10/18 0415 Endotracheal Tube 2 days          Peripheral Intravenous Line                 Peripheral IV - Single Lumen 06/10/18 0215 Right Upper Arm 2 days         Peripheral IV - Single Lumen 06/10/18 0243 Left Upper Arm 2 days              Significant Labs:    CBC/Anemia Profile:    Recent Labs  Lab 06/11/18  0229 06/12/18  0400   WBC 21.61* 27.09*   HGB 8.8* 8.7*   HCT 27.3* 27.4*    168   MCV 95 99*   RDW 13.6 14.0        Chemistries:    Recent Labs  Lab 06/11/18  0229 06/11/18  0819  06/11/18  1939 06/11/18  2337 06/12/18  0400     143 142  < > 141 141 140  140   K 4.4  4.4 4.0  < > 4.2 4.6 4.4  4.4     104 103  < > 101 101 100  100   CO2 24  24 25  < > 23 25 21*  21*   BUN 65*  65* 69*  < > 74* 74* 77*  77*   CREATININE 4.5*  4.5* 4.9*  < > 5.6* 5.8* 6.3*  6.3*   CALCIUM 8.8  8.8 8.7  < > 8.5* 8.7 8.5*  8.5*   ALBUMIN 2.8*  --   --   --   --  2.8*   PROT 6.1  --   --   --   --  6.4   BILITOT 0.4   --   --   --   --  0.4   ALKPHOS 91  --   --   --   --  96   *  --   --   --   --  172*   *  --   --   --   --  130*   MG 1.7 2.2  --   --   --  2.2   PHOS 3.7 4.2  < > 5.5* 6.0* 6.2*   < > = values in this interval not displayed.    BMP:     Recent Labs  Lab 06/12/18  0400   GLU 86  86  86     140   K 4.4  4.4     100   CO2 21*  21*   BUN 77*  77*   CREATININE 6.3*  6.3*   CALCIUM 8.5*  8.5*   MG 2.2     CMP:   Recent Labs  Lab 06/11/18  0229  06/11/18  1939 06/11/18  2337 06/12/18  0400     143  < > 141 141 140  140   K 4.4  4.4  < > 4.2 4.6 4.4  4.4     104  < > 101 101 100  100   CO2 24  24  < > 23 25 21*  21*   *  116*  116*  < > 95  95 93  93 86  86  86   BUN 65*  65*  < > 74* 74* 77*  77*   CREATININE 4.5*  4.5*  < > 5.6* 5.8* 6.3*  6.3*   CALCIUM 8.8  8.8  < > 8.5* 8.7 8.5*  8.5*   PROT 6.1  --   --   --  6.4   ALBUMIN 2.8*  --   --   --  2.8*   BILITOT 0.4  --   --   --  0.4   ALKPHOS 91  --   --   --  96   *  --   --   --  130*   *  --   --   --  172*   ANIONGAP 15  15  < > 17* 15 19*  19*   EGFRNONAA 11.2*  11.2*  < > 8.6* 8.3* 7.5*  7.5*   < > = values in this interval not displayed.  Cardiac Markers: No results for input(s): CKMB, TROPONINT, MYOGLOBIN in the last 48 hours.  Coagulation:     Recent Labs  Lab 06/12/18  0400   INR 1.0   APTT 35.1*     Lactic Acid:     Recent Labs  Lab 06/11/18  1938 06/11/18  2337 06/12/18  0400   LACTATE 0.7 0.8 0.8       Significant Imaging: I have reviewed all pertinent imaging results/findings within the past 24 hours.  I have reviewed and interpreted all pertinent imaging results/findings within the past 24 hours.    Assessment/Plan:     Pulmonary   On mechanically assisted ventilation    - Intubated during the code, currently on   Vent Mode: A/C  Oxygen Concentration (%):  [50-61] 51  Resp Rate Total:  [14 br/min-23 br/min] 17 br/min  Vt Set:  [440 mL] 440 mL  PEEP/CPAP:  [8  cmH20] 8 cmH20  Mean Airway Pressure:  [12 ndR47-96 cmH20] 17 cmH20        Cardiac/Vascular   * Cardiac arrest with ventricular fibrillation    - Etiology is most likely Nonprimary VF related to ischemic episode as his symptoms preceding the code and ECG finding of diffuse ST depression   - Cardiology consulted, and will treat him medically with DAPT, Heparin; no further intervention recommended by cards due to patient's high cardiac arrest hospital prognostication score  - Undergoing Therapeutic Hypothermia, started rewarming yesterday 6/11  - neuro exam shows sluggish pupils, gag reflex present, questionable corneal reflexes, withdraws to pain LLE only  - EEG completed yesterday shows rate epileptiform discharges and severe generalized slowing suggestive of diffuse multifocal cerebral dysfunction        Acute coronary syndrome    - Significant history of Coronary artery disease and CABG ~ 7 years ago, and ischemic HFrEF   - TRINY score is 7 points 41% risk at 14 days of: all-cause mortality  - Will proceed with medical management with DAPT and Heparin        Coronary artery disease involving autologous artery coronary bypass graft    - Please see Acute coronary syndrome and principal problem for more elaboration.         Chronic systolic heart failure    - Ischemic in nature given his history of CAD  - Bedside echo showed EF ~ 30%, repeat 2D Echo with doppler showed EF 35%        Hyperlipemia    - He is taking Statin at home  - On Lipitor 40        HTN (hypertension)    - Taking Hydralazine, Coreg and Amlodipine at home  - Will hold off on his medication for now        Oncology   Leukocytosis    - Found in his CBC on presentation; however, no concern for infectious etiology   - Will hold off on antibiotics for now, will address if there is decompensation         Endocrine   Type II diabetes mellitus    - He is on Insulin glargine for his diabetes at home  - Will hold on medication in current setting and will  continue to manage his diabetes per target for ICU patient 140-180            Critical Care Daily Checklist:    A: Awake: RASS Goal/Actual Goal: RASS Goal: -3-->moderate sedation  Actual: Kevin Agitation Sedation Scale (RASS): (P) Moderate sedation   B: Spontaneous Breathing Trial Performed?  No   C: SAT & SBT Coordinated?  N/A               D: Delirium: CAM-ICU Overall CAM-ICU: (P) Positive   E: Early Mobility Performed? No   F: Feeding Goal: Goals: Meet % EEN, EPN  Status: Nutrition Goal Status: new   Current Diet Order   Procedures    Diet NPO      AS: Analgesia/Sedation Fentanyl and propofol   T: Thromboembolic Prophylaxis Heparin ggt   H: HOB > 300 Yes   U: Stress Ulcer Prophylaxis (if needed) Famotidine    G: Glucose Control qhs detemir 10 units, LDSSI   B: Bowel Function Stool Occurrence: 0   I: Indwelling Catheter (Lines & Casillas) Necessity NG/OG tube, periph IV x2, zoll catheter R femoral, casillas, ET tube   D: De-escalation of Antimicrobials/Pharmacotherapies Rocephin for UTI    Plan for the day/ETD Rewarming patient    Code Status:  Family/Goals of Care: DNR         Critical secondary to Patient has a condition that poses threat to life and bodily function: cardiac arrest      Critical care was time spent personally by me on the following activities: development of treatment plan with patient or surrogate and bedside caregivers, discussions with consultants, evaluation of patient's response to treatment, examination of patient, ordering and performing treatments and interventions, ordering and review of laboratory studies, ordering and review of radiographic studies, pulse oximetry, re-evaluation of patient's condition. This critical care time did not overlap with that of any other provider or involve time for any procedures.     Shelia Benavides MD  Critical Care Medicine  [facility]

## 2018-06-12 NOTE — PROGRESS NOTES
CCS MD Bowser at bedside. Sedation stopped for 20 min to obtain neuro assessment. Orders to slowly wean off fentanyl and, if needed, increase propofol. MD aware UOP <5cc/hr, and CBG in 70s (orders to hold long acting at this time). WCTM.

## 2018-06-12 NOTE — PROGRESS NOTES
Notified Dr. Weathers that Cardene parameters are for SBP < 160, but currently SBP are in low 170s and DBP are in low 40s with MAPs in the high 60s. Pt is not symptomatic. Currently on 5 of Cardene. Instructed to stick to the original order parameters, but titrate slowly and call back if they continue to drop farther.

## 2018-06-12 NOTE — ASSESSMENT & PLAN NOTE
- Intubated during the code, currently on   Vent Mode: A/C  Oxygen Concentration (%):  [50-61] 51  Resp Rate Total:  [14 br/min-23 br/min] 17 br/min  Vt Set:  [440 mL] 440 mL  PEEP/CPAP:  [8 cmH20] 8 cmH20  Mean Airway Pressure:  [12 baT25-02 cmH20] 17 cmH20

## 2018-06-13 NOTE — SIGNIFICANT EVENT
Death Note    Called to patients bedside by nurse and was told that Mr. Darren Villanueva has flat rhythm on the strip. Patients lungs auscultated with absent breath sounds.  Patient's chest wall without rise and fall.  Auscultated patients's heart.  Patient with absent heart sounds.  Patient without peripheral pulses on palpation of the radial arteries in the wrist.  Patient's pupils unreactive to light.    Time of Death 1345.    Patient's preliminary cause of death cardiopulmonary arrest

## 2018-06-13 NOTE — PROGRESS NOTES
CCS MD made aware of patient 02sat drop to 87-90. RT called and patient turned to 100% FiO2 per MD verbal order. Will continue to monitor.

## 2018-06-13 NOTE — ASSESSMENT & PLAN NOTE
- Etiology is most likely Nonprimary VF related to ischemic episode as his symptoms preceding the code and ECG finding of diffuse ST depression   - Cardiology consulted, and will treat him medically with DAPT, Heparin; no further intervention recommended by cards due to patient's high cardiac arrest hospital prognostication score  - underwentTherapeutic Hypothermia, started rewarming 6/11  - EEG completed shows rate epileptiform discharges and severe generalized slowing suggestive of diffuse multifocal cerebral dysfunction  - pt is now 72 hours post cardiac arrest and neuro exam shows no improvement- sluggish pupils, gag reflex present, no corneal reflexes  - family would like to withdraw care today and institute comfort measures

## 2018-06-13 NOTE — SUBJECTIVE & OBJECTIVE
Interval History: Patient is 72 hours post cardiac arrest, s/p targeted temperature management, without improvement in neurological exam today when off fentanyl, propofol. He has gag reflexes, sluggish pupils, no corneal reflex. Pt anuric over past few hours.    Past Medical History:   Diagnosis Date    Arrhythmia, atrial     Hyperlipidemia     Hypertension     Renal failure, unspecified      Past Surgical History:   Procedure Laterality Date    Aortocoronary Bypass       Review of patient's allergies indicates:  No Known Allergies    Family History     None        Social History Main Topics    Smoking status: Never Smoker    Smokeless tobacco: Not on file    Alcohol use No    Drug use: No    Sexual activity: Not on file      Review of Systems   Unable to perform ROS: Intubated     Objective:     Vital Signs (Most Recent):  Temp: 97.7 °F (36.5 °C) (06/13/18 0900)  Pulse: 75 (06/13/18 0900)  Resp: 17 (06/13/18 0315)  BP: (!) 117/56 (06/13/18 0900)  SpO2: 96 % (06/13/18 0900) Vital Signs (24h Range):  Temp:  [97.7 °F (36.5 °C)-98.1 °F (36.7 °C)] 97.7 °F (36.5 °C)  Pulse:  [63-83] 75  Resp:  [17-20] 17  SpO2:  [86 %-100 %] 96 %  BP: ()/(50-67) 117/56   Weight: 89.7 kg (197 lb 12 oz)  Body mass index is 26.82 kg/m².      Intake/Output Summary (Last 24 hours) at 06/13/18 0946  Last data filed at 06/13/18 0900   Gross per 24 hour   Intake           623.96 ml   Output              158 ml   Net           465.96 ml       Physical Exam   Constitutional:   Mechanical ventilation    HENT:   Head: Normocephalic and atraumatic.   Mouth/Throat: No oropharyngeal exudate.   Eyes: Pupils are equal, round, and reactive to light. Right eye exhibits no discharge. Left eye exhibits no discharge.   Neck: Normal range of motion. No JVD present. No thyromegaly present.   Cardiovascular: Normal rate and regular rhythm.  Exam reveals no friction rub.    Murmur heard.  V paced rhythm    Pulmonary/Chest: Effort normal. No  respiratory distress. He has wheezes. He has rales.   Abdominal: Soft. Bowel sounds are normal. He exhibits no distension. There is no tenderness.   Musculoskeletal: Normal range of motion. He exhibits edema. He exhibits no deformity.   Neurological:   Sedated (fentanyl)        Vents:  Vent Mode: A/C (06/13/18 0855)  Ventilator Initiated: Yes (06/10/18 0249)  Set Rate: 14 bmp (06/13/18 0855)  Vt Set: 440 mL (06/13/18 0855)  Pressure Support: 0 cmH20 (06/10/18 0713)  PEEP/CPAP: 15 cmH20 (06/13/18 0855)  Oxygen Concentration (%): 91 (06/13/18 0900)  Peak Airway Pressure: 41 cmH2O (06/13/18 0855)  Plateau Pressure: 23 cmH20 (06/13/18 0855)  Total Ve: 8.67 mL (06/13/18 0855)  F/VT Ratio<105 (RSBI): (!) 6.59 (06/10/18 1055)  Lines/Drains/Airways     Central Venous Catheter Line                 Percutaneous Central Line Insertion/Assessment - quad lumen  06/10/18 0629 right femoral 3 days          Drain                 NG/OG Tube 06/10/18 0429 orogastric 18 Fr. Center mouth 3 days         Urethral Catheter 06/10/18 0255 Temperature probe 3 days          Airway                 Airway - Non-Surgical 06/10/18 0415 Endotracheal Tube 3 days          Peripheral Intravenous Line                 Peripheral IV - Single Lumen 06/10/18 0215 Right Upper Arm 3 days         Peripheral IV - Single Lumen 06/10/18 0243 Left Upper Arm 3 days              Significant Labs:    CBC/Anemia Profile:    Recent Labs  Lab 06/12/18  0400 06/13/18  0230   WBC 27.09* 22.29*   HGB 8.7* 8.6*   HCT 27.4* 27.1*    165   MCV 99* 99*   RDW 14.0 14.3        Chemistries:    Recent Labs  Lab 06/12/18  0400  06/12/18  1120 06/12/18  1807 06/13/18  0230     140  < > 140 140 138  138   K 4.4  4.4  < > 4.6 4.8 5.2*  5.2*     100  < > 99 99 98  98   CO2 21*  21*  < > 21* 21* 18*  18*   BUN 77*  77*  < > 84* 86* 97*  97*   CREATININE 6.3*  6.3*  < > 6.7* 7.1* 7.8*  7.8*   CALCIUM 8.5*  8.5*  < > 8.5* 8.5* 8.3*  8.3*   ALBUMIN 2.8*   --   --   --  2.6*   PROT 6.4  --   --   --  6.5   BILITOT 0.4  --   --   --  0.5   ALKPHOS 96  --   --   --  104   *  --   --   --  115*   *  --   --   --  76*   MG 2.2  --  2.3  --  2.1   PHOS 6.2*  < > 6.9* 7.7* 8.9*   < > = values in this interval not displayed.    BMP:     Recent Labs  Lab 06/13/18  0230   *  163*  163*     138   K 5.2*  5.2*   CL 98  98   CO2 18*  18*   BUN 97*  97*   CREATININE 7.8*  7.8*   CALCIUM 8.3*  8.3*   MG 2.1     CMP:   Recent Labs  Lab 06/12/18  0400  06/12/18  1120 06/12/18  1807 06/13/18  0230     140  < > 140 140 138  138   K 4.4  4.4  < > 4.6 4.8 5.2*  5.2*     100  < > 99 99 98  98   CO2 21*  21*  < > 21* 21* 18*  18*   GLU 86  86  86  < > 111*  111* 125*  125* 163*  163*  163*   BUN 77*  77*  < > 84* 86* 97*  97*   CREATININE 6.3*  6.3*  < > 6.7* 7.1* 7.8*  7.8*   CALCIUM 8.5*  8.5*  < > 8.5* 8.5* 8.3*  8.3*   PROT 6.4  --   --   --  6.5   ALBUMIN 2.8*  --   --   --  2.6*   BILITOT 0.4  --   --   --  0.5   ALKPHOS 96  --   --   --  104   *  --   --   --  76*   *  --   --   --  115*   ANIONGAP 19*  19*  < > 20* 20* 22*  22*   EGFRNONAA 7.5*  7.5*  < > 6.9* 6.5* 5.8*  5.8*   < > = values in this interval not displayed.  Cardiac Markers: No results for input(s): CKMB, TROPONINT, MYOGLOBIN in the last 48 hours.  Coagulation:     Recent Labs  Lab 06/13/18  0230   INR 1.0   APTT 24.2     Lactic Acid:     Recent Labs  Lab 06/12/18  1120 06/12/18  1807 06/13/18  0230   LACTATE 1.2 1.2 1.2       Significant Imaging: I have reviewed all pertinent imaging results/findings within the past 24 hours.  I have reviewed and interpreted all pertinent imaging results/findings within the past 24 hours.

## 2018-06-13 NOTE — ASSESSMENT & PLAN NOTE
- Etiology is most likely Nonprimary VF related to ischemic episode as his symptoms preceding the code and ECG finding of diffuse ST depression   - Cardiology consulted, treated medically with DAPT, Heparin; no further intervention recommended by cards due to patient's high cardiac arrest hospital prognostication score  - underwentTherapeutic Hypothermia, started rewarming   - EEG completed shows rate epileptiform discharges and severe generalized slowing suggestive of diffuse multifocal cerebral dysfunction  - at 72 hours post cardiac arrest, neuro exam showed no improvement- sluggish pupils, gag reflex present, no corneal reflexes  - family chose to withdraw care today and instituted comfort measures with PRN ativan and morphine ggt. Pt extubated and  shortly thereafter

## 2018-06-13 NOTE — PROGRESS NOTES
[facility]  Critical Care Medicine  Progress Note    Patient Name: Darren Villanueva  MRN: [id number]  Admission Date: 6/10/2018  Hospital Length of Stay: 3 days  Code Status: DNR  Attending Provider: Vidal Cerrato*  Primary Care Provider: Valentin Adams MD   Principal Problem: Cardiac arrest with ventricular fibrillation    Subjective:     HPI:  This is Mr. Darren Villanueva, 83 year old male with PMHx significant for extensive cardiac history including CAD involving autologous artery coronary bypass graft with previous Triple bypass surgery, Pacemaker, dyslipidemia and diabetes mellitus. He was brought in to [facility] ED by EMS after found to have cardiac arrest occurred at home at 0300, when the family emergency called EMS, and started CPR in Ambulance and transferred him to [facility] ED, rhythm was V Fibrillation and received shock and eventually achieved ROCS, intubated and had an ECG that showed diffuse ST depression. Cardiology consulted given his underlying etiology of cardiac arrest could be ischemic in etiology, however, decision was made to proceed MICU admission and consult cardiology for possible intervention. Of note, per son and niece, Mr. Darren Villanueva was having symptoms of chest pain and anginal pain on physical exertion, and estimated NYHA for him ~ class II. His cardiac care mostly in [facility], where he was following after his CABG ~ 7 years ago, and recently had dual chamber pacemaker.     Hospital/ICU Course:  Admitted to MICU 6/10 after out of hospital cardiac arrest 2/2 v.fib, with EKG showing ischemic changes. Cardiology assessed patient and recommended medical management only with DAPT and heparin ggt. No further intervention recommended by cards 2/2 high cardiac arrest prognostication score. Patient undergoing targeted temperature management. Rewarming started 6/11. EEG completed showed diffuse generalized slowing likely representing severe cerebral dysfunction. Today  6/13 patient is 72 hrs post cardiac arrest without improvement in neurological exam off sedation. Family would like to withdraw care today.     Interval History: Patient is 72 hours post cardiac arrest, s/p targeted temperature management, without improvement in neurological exam today when off fentanyl, propofol. He has gag reflexes, sluggish pupils, no corneal reflex. Pt anuric over past few hours.    Past Medical History:   Diagnosis Date    Arrhythmia, atrial     Hyperlipidemia     Hypertension     Renal failure, unspecified        Past Surgical History:   Procedure Laterality Date    Aortocoronary Bypass         Review of patient's allergies indicates:  No Known Allergies    Family History     None        Social History Main Topics    Smoking status: Never Smoker    Smokeless tobacco: Not on file    Alcohol use No    Drug use: No    Sexual activity: Not on file      Review of Systems   Unable to perform ROS: Intubated     Objective:     Vital Signs (Most Recent):  Temp: 97.7 °F (36.5 °C) (06/13/18 0900)  Pulse: 75 (06/13/18 0900)  Resp: 17 (06/13/18 0315)  BP: (!) 117/56 (06/13/18 0900)  SpO2: 96 % (06/13/18 0900) Vital Signs (24h Range):  Temp:  [97.7 °F (36.5 °C)-98.1 °F (36.7 °C)] 97.7 °F (36.5 °C)  Pulse:  [63-83] 75  Resp:  [17-20] 17  SpO2:  [86 %-100 %] 96 %  BP: ()/(50-67) 117/56   Weight: 89.7 kg (197 lb 12 oz)  Body mass index is 26.82 kg/m².      Intake/Output Summary (Last 24 hours) at 06/13/18 0946  Last data filed at 06/13/18 0900   Gross per 24 hour   Intake           623.96 ml   Output              158 ml   Net           465.96 ml       Physical Exam   Constitutional:   Mechanical ventilation    HENT:   Head: Normocephalic and atraumatic.   Mouth/Throat: No oropharyngeal exudate.   Eyes: Pupils are equal, round, and reactive to light. Right eye exhibits no discharge. Left eye exhibits no discharge.   Neck: Normal range of motion. No JVD present. No thyromegaly present.    Cardiovascular: Normal rate and regular rhythm.  Exam reveals no friction rub.    Murmur heard.  V paced rhythm    Pulmonary/Chest: Effort normal. No respiratory distress. He has wheezes. He has rales.   Abdominal: Soft. Bowel sounds are normal. He exhibits no distension. There is no tenderness.   Musculoskeletal: Normal range of motion. He exhibits edema. He exhibits no deformity.   Neurological:   Sedated (fentanyl)        Vents:  Vent Mode: A/C (06/13/18 0855)  Ventilator Initiated: Yes (06/10/18 0249)  Set Rate: 14 bmp (06/13/18 0855)  Vt Set: 440 mL (06/13/18 0855)  Pressure Support: 0 cmH20 (06/10/18 0713)  PEEP/CPAP: 15 cmH20 (06/13/18 0855)  Oxygen Concentration (%): 91 (06/13/18 0900)  Peak Airway Pressure: 41 cmH2O (06/13/18 0855)  Plateau Pressure: 23 cmH20 (06/13/18 0855)  Total Ve: 8.67 mL (06/13/18 0855)  F/VT Ratio<105 (RSBI): (!) 6.59 (06/10/18 1055)  Lines/Drains/Airways     Central Venous Catheter Line                 Percutaneous Central Line Insertion/Assessment - quad lumen  06/10/18 0629 right femoral 3 days          Drain                 NG/OG Tube 06/10/18 0429 orogastric 18 Fr. Center mouth 3 days         Urethral Catheter 06/10/18 0255 Temperature probe 3 days          Airway                 Airway - Non-Surgical 06/10/18 0415 Endotracheal Tube 3 days          Peripheral Intravenous Line                 Peripheral IV - Single Lumen 06/10/18 0215 Right Upper Arm 3 days         Peripheral IV - Single Lumen 06/10/18 0243 Left Upper Arm 3 days              Significant Labs:    CBC/Anemia Profile:    Recent Labs  Lab 06/12/18  0400 06/13/18  0230   WBC 27.09* 22.29*   HGB 8.7* 8.6*   HCT 27.4* 27.1*    165   MCV 99* 99*   RDW 14.0 14.3        Chemistries:    Recent Labs  Lab 06/12/18  0400  06/12/18  1120 06/12/18  1807 06/13/18  0230     140  < > 140 140 138  138   K 4.4  4.4  < > 4.6 4.8 5.2*  5.2*     100  < > 99 99 98  98   CO2 21*  21*  < > 21* 21* 18*  18*    BUN 77*  77*  < > 84* 86* 97*  97*   CREATININE 6.3*  6.3*  < > 6.7* 7.1* 7.8*  7.8*   CALCIUM 8.5*  8.5*  < > 8.5* 8.5* 8.3*  8.3*   ALBUMIN 2.8*  --   --   --  2.6*   PROT 6.4  --   --   --  6.5   BILITOT 0.4  --   --   --  0.5   ALKPHOS 96  --   --   --  104   *  --   --   --  115*   *  --   --   --  76*   MG 2.2  --  2.3  --  2.1   PHOS 6.2*  < > 6.9* 7.7* 8.9*   < > = values in this interval not displayed.    BMP:     Recent Labs  Lab 06/13/18  0230   *  163*  163*     138   K 5.2*  5.2*   CL 98  98   CO2 18*  18*   BUN 97*  97*   CREATININE 7.8*  7.8*   CALCIUM 8.3*  8.3*   MG 2.1     CMP:   Recent Labs  Lab 06/12/18  0400  06/12/18  1120 06/12/18  1807 06/13/18  0230     140  < > 140 140 138  138   K 4.4  4.4  < > 4.6 4.8 5.2*  5.2*     100  < > 99 99 98  98   CO2 21*  21*  < > 21* 21* 18*  18*   GLU 86  86  86  < > 111*  111* 125*  125* 163*  163*  163*   BUN 77*  77*  < > 84* 86* 97*  97*   CREATININE 6.3*  6.3*  < > 6.7* 7.1* 7.8*  7.8*   CALCIUM 8.5*  8.5*  < > 8.5* 8.5* 8.3*  8.3*   PROT 6.4  --   --   --  6.5   ALBUMIN 2.8*  --   --   --  2.6*   BILITOT 0.4  --   --   --  0.5   ALKPHOS 96  --   --   --  104   *  --   --   --  76*   *  --   --   --  115*   ANIONGAP 19*  19*  < > 20* 20* 22*  22*   EGFRNONAA 7.5*  7.5*  < > 6.9* 6.5* 5.8*  5.8*   < > = values in this interval not displayed.  Cardiac Markers: No results for input(s): CKMB, TROPONINT, MYOGLOBIN in the last 48 hours.  Coagulation:     Recent Labs  Lab 06/13/18  0230   INR 1.0   APTT 24.2     Lactic Acid:     Recent Labs  Lab 06/12/18  1120 06/12/18  1807 06/13/18  0230   LACTATE 1.2 1.2 1.2       Significant Imaging: I have reviewed all pertinent imaging results/findings within the past 24 hours.  I have reviewed and interpreted all pertinent imaging results/findings within the past 24 hours.    Assessment/Plan:     Pulmonary   Acute hypoxemic  respiratory failure    - Intubated during the code, currently on   Vent Mode: A/C  Oxygen Concentration (%):  [] 100  Resp Rate Total:  [14 br/min-23 br/min] 23 br/min  Vt Set:  [440 mL] 440 mL  PEEP/CPAP:  [5 cmH20-15 cmH20] 15 cmH20  Mean Airway Pressure:  [12 tiZ08-99 cmH20] 24 cmH20   Pt with desaturation yesterday, and again today into 80s, now in FIO2 100% and PEEP of 18, concerning for flash pulmonary edema        Cardiac/Vascular   * Cardiac arrest with ventricular fibrillation    - Etiology is most likely Nonprimary VF related to ischemic episode as his symptoms preceding the code and ECG finding of diffuse ST depression   - Cardiology consulted, and will treat him medically with DAPT, Heparin; no further intervention recommended by cards due to patient's high cardiac arrest hospital prognostication score  - underwentTherapeutic Hypothermia, started rewarming 6/11  - EEG completed shows rate epileptiform discharges and severe generalized slowing suggestive of diffuse multifocal cerebral dysfunction  - pt is now 72 hours post cardiac arrest and neuro exam shows no improvement- sluggish pupils, gag reflex present, no corneal reflexes  - family would like to withdraw care today and institute comfort measures           Acute coronary syndrome    - Significant history of Coronary artery disease and CABG ~ 7 years ago, and ischemic HFrEF   - TRINY score is 7 points 41% risk at 14 days of: all-cause mortality  - Received medical management with DAPT and Heparin        Coronary artery disease involving autologous artery coronary bypass graft    - Please see Acute coronary syndrome and principal problem for more elaboration.         Chronic systolic heart failure    - Ischemic in nature given his history of CAD  - Bedside echo showed EF ~ 30%, repeat 2D Echo with doppler showed EF 35%        Hyperlipemia    - He is taking Statin at home  - On Lipitor 40        HTN (hypertension)    - Taking Hydralazine, Coreg  and Amlodipine at home  - Will hold off on his medication for now        Oncology   Leukocytosis    - Found in his CBC on presentation; however, no concern for infectious etiology   - Will hold off on antibiotics for now, will address if there is decompensation         Endocrine   Type II diabetes mellitus    - He is on Insulin glargine for his diabetes at home  - Will hold on medication in current setting and will continue to manage his diabetes per target for ICU patient 140-180            Critical Care Daily Checklist:    A: Awake: RASS Goal/Actual Goal: RASS Goal: -3-->moderate sedation  Actual: Kevin Agitation Sedation Scale (RASS): Deep sedation   B: Spontaneous Breathing Trial Performed?  n/a   C: SAT & SBT Coordinated?  n/a                    D: Delirium: CAM-ICU Overall CAM-ICU: Positive   E: Early Mobility Performed? No   F: Feeding Goal: Goals: Meet % EEN, EPN  Status: Nutrition Goal Status: new   Current Diet Order   Procedures    Diet NPO      AS: Analgesia/Sedation Fentanyl, propofol   T: Thromboembolic Prophylaxis heparin   H: HOB > 300 Yes   U: Stress Ulcer Prophylaxis (if needed) pepcid   G: Glucose Control SSI   B: Bowel Function Stool Occurrence: 0   I: Indwelling Catheter (Lines & Casillas) Necessity ET tube, zoll catheter R femoral, periph IV x2, NG/OG, casillas   D: De-escalation of Antimicrobials/Pharmacotherapies Rocephin for UTI    Plan for the day/ETD Withdraw care    Code Status:  Family/Goals of Care: DNR         Critical secondary to Patient has a condition that poses threat to life and bodily function: cardiac arrest      Critical care was time spent personally by me on the following activities: development of treatment plan with patient or surrogate and bedside caregivers, discussions with consultants, evaluation of patient's response to treatment, examination of patient, ordering and performing treatments and interventions, ordering and review of laboratory studies, ordering and  review of radiographic studies, pulse oximetry, re-evaluation of patient's condition. This critical care time did not overlap with that of any other provider or involve time for any procedures.     Shelia Benavides MD  Critical Care Medicine  [facility]

## 2018-06-13 NOTE — PLAN OF CARE
Problem: Patient Care Overview  Goal: Plan of Care Review  Outcome: Ongoing (interventions implemented as appropriate)  No acute events throughout day. See vital signs and assessments in flowsheets. See below for updates on today's progress.     Pulmonary: ETT 27cm at lip. On FiO2 100% and PEEP 15 (from 75% and 5 last night). Sats at 100% now. Coarse LS bilaterally.    Cardiovascular: Vent paced rhythm HR 70s, normotensive 90s/50s, 1+ pulses. No ectopies.    Neurological: GCS 4, pupils 2 and sluggish, does not withdraw from bilateral upper extremities, withdraws from bilateral lower when bottom of feet scratched.  No corneal, no gag, intact cough.     Gastrointestinal: No BM, OG on LIS. +BSx4.    Genitourinary: Casillas UOP 25 cc/shift.     Endocrine: .    Integumentary/Other: Normothermic overnight at 37C. Zoll, quad lumen, PIV, casillas.     Infusions: Propofol at 15 and fentanyl at 100    Patient progressing towards goals as tolerated, plan of care communicated and reviewed with Darren Villanueva and family. All concerns addressed. Will continue to monitor.

## 2018-06-13 NOTE — DISCHARGE SUMMARY
[facility]  Critical Care Medicine  Discharge Summary      Patient Name: Darren Villanueva  MRN: [id number]  Admission Date: 6/10/2018  Hospital Length of Stay: 3 days  Discharge Date and Time:  06/13/2018 2:08 PM  Attending Physician: Vidal Cerrato*   Discharging Provider: Shelia Benavides MD  Primary Care Provider: Valentin Adams MD  Reason for Admission: cardiac arrest    HPI:   This is Mr. Darren Villanueva, 83 year old male with PMHx significant for extensive cardiac history including CAD involving autologous artery coronary bypass graft with previous Triple bypass surgery, Pacemaker, dyslipidemia and diabetes mellitus. He was brought in to [facility] ED by EMS after found to have cardiac arrest occurred at home at 0300, when the family emergency called EMS, and started CPR in Ambulance and transferred him to [facility] ED, rhythm was V Fibrillation and received shock and eventually achieved ROCS, intubated and had an ECG that showed diffuse ST depression. Cardiology consulted given his underlying etiology of cardiac arrest could be ischemic in etiology, however, decision was made to proceed MICU admission and consult cardiology for possible intervention. Of note, per son and niece, Mr. Darren Villanueva was having symptoms of chest pain and anginal pain on physical exertion, and estimated NYHA for him ~ class II. His cardiac care mostly in [facility], where he was following after his CABG ~ 7 years ago, and recently had dual chamber pacemaker.     * No surgery found *    Indwelling Lines/Drains at Time of Discharge:   Lines/Drains/Airways     Central Venous Catheter Line                 Percutaneous Central Line Insertion/Assessment - quad lumen  06/10/18 0629 right femoral 3 days          Drain                 NG/OG Tube 06/10/18 0429 orogastric 18 Fr. Center mouth 3 days         Urethral Catheter 06/10/18 0255 Temperature probe 3 days          Airway                 Airway - Non-Surgical 06/10/18  0415 Endotracheal Tube 3 days              Hospital Course:   Admitted to MICU 6/10 after out of hospital cardiac arrest 2/2 vnadine, with EKG showing ischemic changes. Cardiology assessed patient and recommended medical management only with DAPT and heparin ggt. No further intervention recommended by cards 2/2 high cardiac arrest prognostication score. Patient underwent targeted temperature management. Rewarming started . EEG completed showed diffuse generalized slowing likely representing severe cerebral dysfunction. Today  patient is 72 hrs post cardiac arrest without improvement in neurological exam off sedation. Family would like to withdraw care today. Patient started on morphine ggt and PRN ativan for comfort and extubated. Patient  shortly thereafter.     Consults         Status Ordering Provider     Inpatient consult to Critical Care Medicine  Once     Provider:  (Not yet assigned)    Completed SEBASTIÁN SPICER     Inpatient consult to Registered Dietitian/Nutritionist  Once     Provider:  (Not yet assigned)    Completed MACRINA ANAYA        Significant Labs:  BMP:   Recent Labs  Lab 18  0230   *  163*  163*     138   K 5.2*  5.2*   CL 98  98   CO2 18*  18*   BUN 97*  97*   CREATININE 7.8*  7.8*   CALCIUM 8.3*  8.3*   MG 2.1     CMP:   Recent Labs  Lab 18  0400  18  1120 18  1807 18  0230     140  < > 140 140 138  138   K 4.4  4.4  < > 4.6 4.8 5.2*  5.2*     100  < > 99 99 98  98   CO2 21*  21*  < > 21* 21* 18*  18*   GLU 86  86  86  < > 111*  111* 125*  125* 163*  163*  163*   BUN 77*  77*  < > 84* 86* 97*  97*   CREATININE 6.3*  6.3*  < > 6.7* 7.1* 7.8*  7.8*   CALCIUM 8.5*  8.5*  < > 8.5* 8.5* 8.3*  8.3*   PROT 6.4  --   --   --  6.5   ALBUMIN 2.8*  --   --   --  2.6*   BILITOT 0.4  --   --   --  0.5   ALKPHOS 96  --   --   --  104   *  --   --   --  76*   *  --   --   --  115*    ANIONGAP 19*  19*  < > 20* 20* 22*  22*   EGFRNONAA 7.5*  7.5*  < > 6.9* 6.5* 5.8*  5.8*   < > = values in this interval not displayed.  Coagulation:   Recent Labs  Lab 06/13/18  0230   INR 1.0   APTT 24.2     Lactic Acid:   Recent Labs  Lab 06/12/18  1120 06/12/18  1807 06/13/18  0230   LACTATE 1.2 1.2 1.2       Significant Imaging:  I have reviewed all pertinent imaging results/findings within the past 24 hours.    Pending Diagnostic Studies:     Procedure Component Value Units Date/Time    Basic metabolic panel [[id number]] Collected:  06/12/18 1959    Order Status:  Sent Lab Status:  In process Updated:  06/12/18 2000    Specimen:  Blood from Blood     Glucose, random (Unless on insulin infusion) [[id number]] Collected:  06/12/18 1959    Order Status:  Sent Lab Status:  In process Updated:  06/12/18 2000    Specimen:  Blood from Blood     Lactic acid, plasma [[id number]] Collected:  06/12/18 1959    Order Status:  Sent Lab Status:  In process Updated:  06/12/18 2000    Specimen:  Blood from Blood     Neuron Specific Enolase, Serum [[id number]] Collected:  06/13/18 0230    Order Status:  Sent Lab Status:  In process Updated:  06/13/18 0310    Specimen:  Blood from Blood     Neuron Specific Enolase, Serum [[id number]] Collected:  06/12/18 0400    Order Status:  Sent Lab Status:  In process Updated:  06/12/18 0402    Specimen:  Blood from Blood     Phosphorus [[id number]] Collected:  06/12/18 1959    Order Status:  Sent Lab Status:  In process Updated:  06/12/18 2000    Specimen:  Blood from Blood     Urinalysis [[id number]] Collected:  06/10/18 0433    Order Status:  Sent Lab Status:  In process Updated:  06/10/18 0434    Specimen:  Urine         Final Active Diagnoses:    Diagnosis Date Noted POA    PRINCIPAL PROBLEM:  Cardiac arrest with ventricular fibrillation [I46.9, I49.01] 06/10/2018 Yes    Cardiac arrest [I46.9]  Unknown    Chronic systolic heart failure [I50.22] 06/10/2018 Yes    Coronary artery  disease involving autologous artery coronary bypass graft [I25.810] 06/10/2018 Yes    Leukocytosis [D72.829] 06/10/2018 Yes    Acute coronary syndrome [I24.9] 06/10/2018 Yes    Acute hypoxemic respiratory failure [J96.01] 06/10/2018 No    HTN (hypertension) [I10] 2014 Yes    Hyperlipemia [E78.5] 2014 Yes    Type II diabetes mellitus [E11.9] 2014 Yes      Problems Resolved During this Admission:    Diagnosis Date Noted Date Resolved POA     Pulmonary   Acute hypoxemic respiratory failure    - Intubated during the code,    -Pt with desaturation yesterday, and again today into 80s, now in FIO2 100% and PEEP of 18, concerning for flash pulmonary edema  -Family withdrew care and pt terminally extubated today,  shortly thereafter        Cardiac/Vascular   * Cardiac arrest with ventricular fibrillation    - Etiology is most likely Nonprimary VF related to ischemic episode as his symptoms preceding the code and ECG finding of diffuse ST depression   - Cardiology consulted, treated medically with DAPT, Heparin; no further intervention recommended by cards due to patient's high cardiac arrest hospital prognostication score  - underwentTherapeutic Hypothermia, started rewarming   - EEG completed shows rate epileptiform discharges and severe generalized slowing suggestive of diffuse multifocal cerebral dysfunction  - at 72 hours post cardiac arrest, neuro exam showed no improvement- sluggish pupils, gag reflex present, no corneal reflexes  - family chose to withdraw care today and instituted comfort measures with PRN ativan and morphine ggt. Pt extubated and  shortly thereafter            Acute coronary syndrome    - Significant history of Coronary artery disease and CABG ~ 7 years ago, and ischemic HFrEF   - TRINY score is 7 points 41% risk at 14 days of: all-cause mortality  - Received medical management with DAPT and Heparin        Coronary artery disease involving autologous artery  coronary bypass graft    - Please see Acute coronary syndrome and principal problem for more elaboration.         Chronic systolic heart failure    - Ischemic in nature given his history of CAD  - Bedside echo showed EF ~ 30%, repeat 2D Echo with doppler showed EF 35%        Hyperlipemia    - He is taking Statin at home  - On Lipitor 40        HTN (hypertension)    - Taking Hydralazine, Coreg and Amlodipine at home  - Will hold off on his medication for now        Oncology   Leukocytosis    - Found in his CBC on presentation; however, no concern for infectious etiology   - Will hold off on antibiotics for now, will address if there is decompensation         Endocrine   Type II diabetes mellitus    - He is on Insulin glargine for his diabetes at home  - Will hold on medication in current setting and will continue to manage his diabetes per target for ICU patient 140-180           Discharged Condition:     Disposition:       Patient Instructions:   No discharge procedures on file.  Medications:  Reconciled Home Medications:      Medication List      STOP taking these medications    amLODIPine 10 MG tablet  Commonly known as:  NORVASC     atorvastatin 20 MG tablet  Commonly known as:  LIPITOR     calcitRIOL 0.25 MCG Cap  Commonly known as:  ROCALTROL     carvedilol 6.25 MG tablet  Commonly known as:  COREG     cloNIDine 0.1 MG tablet  Commonly known as:  CATAPRES     hydrALAZINE 25 MG tablet  Commonly known as:  APRESOLINE     LANTUS U-100 INSULIN 100 unit/mL Crtg  Generic drug:  insulin glargine     ONE DAILY MULTIVITAMIN per tablet  Generic drug:  multivitamin             Shelia Benavides MD  Critical Care Medicine  [facility]

## 2018-06-13 NOTE — PLAN OF CARE
"Long discussion this morning with patient's daughter, son, and wife. After 72 hours post-arrest and with all sedation turned off, pt is unarousable and lacking corneal reflex. Pt had previously stated on multiple occasions that he did not want to be "kept alive with machines". As such, family would like to pursue comfort measures including terminal extubation. EP contacted for pacer/defib.    Tucker Madrid MD  Fellow, [facility]  Cell [phone]    "

## 2018-06-13 NOTE — SUBJECTIVE & OBJECTIVE
Patient started on comfort measures today with morphine ggt and ativan PRN. Family has chosen to withdraw care. Patient extubated and  shortly thereafter.

## 2018-06-13 NOTE — ASSESSMENT & PLAN NOTE
- Significant history of Coronary artery disease and CABG ~ 7 years ago, and ischemic HFrEF   - TRINY score is 7 points 41% risk at 14 days of: all-cause mortality  - Received medical management with DAPT and Heparin

## 2018-06-13 NOTE — ASSESSMENT & PLAN NOTE
- Intubated during the code,    -Pt with desaturation yesterday, and again today into 80s, now in FIO2 100% and PEEP of 18, concerning for flash pulmonary edema  -Family withdrew care and pt terminally extubated today,  shortly thereafter

## 2018-06-13 NOTE — ASSESSMENT & PLAN NOTE
- Intubated during the code, currently on   Vent Mode: A/C  Oxygen Concentration (%):  [] 100  Resp Rate Total:  [14 br/min-23 br/min] 23 br/min  Vt Set:  [440 mL] 440 mL  PEEP/CPAP:  [5 cmH20-15 cmH20] 15 cmH20  Mean Airway Pressure:  [12 ulE89-29 cmH20] 24 cmH20   Pt with desaturation yesterday, and again today into 80s, now in FIO2 100% and PEEP of 18, concerning for flash pulmonary edema

## 2018-06-14 PROBLEM — I46.9 CARDIAC ARREST: Status: ACTIVE | Noted: 2018-06-14

## 2018-06-14 NOTE — PLAN OF CARE
18 0936   Final Note   Assessment Type Final Discharge Note   Discharge Disposition    Hospital Follow Up  Appt(s) scheduled? No   Discharge plans and expectations educations in teach back method with documentation complete? No   Right Care Referral Info   Post Acute Recommendation Other  ( in medical facility - woc)   Melany Stephen RN, BSN, CCM  Case Management  [facility]  Ext. [phone]

## 2018-06-15 LAB
BACTERIA BLD CULT: NORMAL
BACTERIA BLD CULT: NORMAL
NSE SERPL-MCNC: 61 NG/ML

## 2018-06-18 NOTE — PHYSICIAN QUERY
PT Name: Darren Villanueva  MR #: [id number]     Physician Query Form - Documentation Clarification      CDS/: Pierre Dee Jr, RN               Contact information:ext [phone]    This form is a permanent document in the medical record.     Query Date: June 18, 2018    By submitting this query, we are merely seeking further clarification of documentation. Please utilize your independent clinical judgment when addressing the question(s) below.    The Medical record reflects the following:    Supporting Clinical Findings Location in Medical Record   Darren Villanueva is a 83 y.o. male w/ hx of chronic kidney disease    Baseline Cr ~3.0.    Hypertension, aortic valvular disease, and diabetes - who presents post cardiac arrest    Currently he is suffering from multiorgan system dysfunction in the setting of prolonged hypoperfusion after multiple rounds of CPR - Neurological injury, acidosis, renal injury.      BUN=39--->70--->97    Creatinine=3.2--->5.0--->7.8    GFR=17---->9.9----->5.8       Anuric throughout the night despite lasix challenge. Cr steadily increasing. Per CCS, will discuss with family today regarding HD cath placement and the start of CRRT    6/10 ED Provider Note      6/10 H&P    6/10 Cardiology Progress Note      6/10 Cardiology Consult Note          6/10-6/13 Labs    6/10-6/13 Labs    6/10-6/13 Labs    6/11 Nursing Progress Note                                                                                      Doctor, Please specify diagnosis or diagnoses associated with above clinical findings.  Specify the Patient's Renal Status    Provider Use Only    (    )Chronic Kidney Disease Stage 3 (eGFR 30-59)    (  X  )Acute Renal Failure on Chronic Kidney Disease Stage 3 (eGFR 30-59)    (    )Chronic Kidney Disease Stage 4 (eGFR 15-29)    (    )Acute Renal Failure on Chronic Kidney Disease Stage 4 (eGFR 15-29)    (    )Other__________________________________________________________                                                                                                              [  ] Clinically undetermined

## 2018-06-18 NOTE — PHYSICIAN QUERY
"PT Name: Darren Villanueva  MR #: [id number]    Physician Query Form - Heart  Condition Clarification     CDS/: Pierre Dee Jr, RN               Contact information:ext [phone]  This form is a permanent document in the medical record.     Query Date: June 18, 2018    By submitting this query, we are merely seeking further clarification of documentation. Please utilize your independent clinical judgment when addressing the question(s) below.    The medical record contains the following   Indicators     Supporting Clinical Findings Location in Medical Record    BNP      EF     x Radiology findings Cardiomegaly with pulmonary vascular congestion and bilateral edema. 6/10 Chest X Ray Report   x Echo Results Left ventricular systolic function appears moderately depressed. Visually estimated ejection fraction is 35% 6/10 2D Echo    "Ascites" documented     x "SOB" or "LENZ" documented  Per his family he had megha complaining of some shortness of breath and chest discomfort the past several days. 6/10 Cardiology Consult Note   x "Hypoxia" documented Acute hypoxic respiratory failure - much worse than yesterday morning.  Sudden increase in need for FiO2 and PEEP. Concerning for flash pulmonary edema 6/13 Critical Care Progress Note   x Heart Failure documented Chronic systolic heart failure   - Ischemic in nature given his history of CAD  - Bedside echo showed EF ~ 30%, repeat 2D Echo with doppler showed EF 35% 6/12 Critical Care Progress Note   x "Edema" documented Musculoskeletal: He exhibits edema (trace ankles). 6/10 Cardiology Consult Note   x Diuretics/Meds furosemide injection 40 mg Once  furosemide injection 80 mg Once  furosemide injection 80 mg Once 6/10 MAR  6/10 MAR  6/10 MAR    Treatment:      Other:          Provider, please specify diagnosis or diagnoses associated with above clinical findings.                               [ X ] Acute on Chronic Systolic Heart Failure ( EF < 40)*  [  ] Chronic Systolic " Heart Failure (EF < 40)*  [  ] Other (please specify): ___________________________________  [  ] Clinically Undetermined            *[employer]                                                                                                          Please document in your progress notes daily for the duration of treatment until resolved and include in your discharge summary.

## 2023-06-06 NOTE — ASSESSMENT & PLAN NOTE
- Found in his CBC on presentation; however, no concern for infectious etiology   - Will hold off on antibiotics for now, will address if there is decompensation    Yes